# Patient Record
Sex: FEMALE | Race: BLACK OR AFRICAN AMERICAN | NOT HISPANIC OR LATINO | URBAN - METROPOLITAN AREA
[De-identification: names, ages, dates, MRNs, and addresses within clinical notes are randomized per-mention and may not be internally consistent; named-entity substitution may affect disease eponyms.]

---

## 2021-02-16 ENCOUNTER — EMERGENCY (EMERGENCY)
Facility: HOSPITAL | Age: 51
LOS: 0 days | Discharge: HOME | End: 2021-02-16
Attending: EMERGENCY MEDICINE
Payer: COMMERCIAL

## 2021-02-16 VITALS
RESPIRATION RATE: 20 BRPM | OXYGEN SATURATION: 98 % | DIASTOLIC BLOOD PRESSURE: 61 MMHG | SYSTOLIC BLOOD PRESSURE: 109 MMHG | HEART RATE: 89 BPM | TEMPERATURE: 98 F

## 2021-02-16 VITALS
HEIGHT: 63 IN | HEART RATE: 108 BPM | DIASTOLIC BLOOD PRESSURE: 94 MMHG | OXYGEN SATURATION: 97 % | WEIGHT: 169.98 LBS | RESPIRATION RATE: 21 BRPM | SYSTOLIC BLOOD PRESSURE: 137 MMHG | TEMPERATURE: 99 F

## 2021-02-16 DIAGNOSIS — D21.9 BENIGN NEOPLASM OF CONNECTIVE AND OTHER SOFT TISSUE, UNSPECIFIED: ICD-10-CM

## 2021-02-16 DIAGNOSIS — U07.1 COVID-19: ICD-10-CM

## 2021-02-16 DIAGNOSIS — R05 COUGH: ICD-10-CM

## 2021-02-16 DIAGNOSIS — R53.1 WEAKNESS: ICD-10-CM

## 2021-02-16 DIAGNOSIS — R50.9 FEVER, UNSPECIFIED: ICD-10-CM

## 2021-02-16 DIAGNOSIS — Z98.89 OTHER SPECIFIED POSTPROCEDURAL STATES: Chronic | ICD-10-CM

## 2021-02-16 LAB
ALBUMIN SERPL ELPH-MCNC: 2.9 G/DL — LOW (ref 3.3–5)
ALP SERPL-CCNC: 164 U/L — HIGH (ref 40–120)
ALT FLD-CCNC: 73 U/L — SIGNIFICANT CHANGE UP (ref 12–78)
ANION GAP SERPL CALC-SCNC: 8 MMOL/L — SIGNIFICANT CHANGE UP (ref 5–17)
AST SERPL-CCNC: 60 U/L — HIGH (ref 15–37)
BASOPHILS # BLD AUTO: 0 K/UL — SIGNIFICANT CHANGE UP (ref 0–0.2)
BASOPHILS NFR BLD AUTO: 0 % — SIGNIFICANT CHANGE UP (ref 0–2)
BILIRUB SERPL-MCNC: 0.5 MG/DL — SIGNIFICANT CHANGE UP (ref 0.2–1.2)
BUN SERPL-MCNC: 10 MG/DL — SIGNIFICANT CHANGE UP (ref 7–23)
CALCIUM SERPL-MCNC: 8.8 MG/DL — SIGNIFICANT CHANGE UP (ref 8.5–10.1)
CHLORIDE SERPL-SCNC: 107 MMOL/L — SIGNIFICANT CHANGE UP (ref 96–108)
CO2 SERPL-SCNC: 27 MMOL/L — SIGNIFICANT CHANGE UP (ref 22–31)
CREAT SERPL-MCNC: 0.61 MG/DL — SIGNIFICANT CHANGE UP (ref 0.5–1.3)
EOSINOPHIL # BLD AUTO: 0 K/UL — SIGNIFICANT CHANGE UP (ref 0–0.5)
EOSINOPHIL NFR BLD AUTO: 0 % — SIGNIFICANT CHANGE UP (ref 0–6)
FLUAV AG NPH QL: SIGNIFICANT CHANGE UP
FLUBV AG NPH QL: SIGNIFICANT CHANGE UP
GLUCOSE SERPL-MCNC: 125 MG/DL — HIGH (ref 70–99)
HCT VFR BLD CALC: 38.5 % — SIGNIFICANT CHANGE UP (ref 34.5–45)
HGB BLD-MCNC: 12.7 G/DL — SIGNIFICANT CHANGE UP (ref 11.5–15.5)
LYMPHOCYTES # BLD AUTO: 0.59 K/UL — LOW (ref 1–3.3)
LYMPHOCYTES # BLD AUTO: 5 % — LOW (ref 13–44)
MANUAL SMEAR VERIFICATION: SIGNIFICANT CHANGE UP
MCHC RBC-ENTMCNC: 27.3 PG — SIGNIFICANT CHANGE UP (ref 27–34)
MCHC RBC-ENTMCNC: 33 GM/DL — SIGNIFICANT CHANGE UP (ref 32–36)
MCV RBC AUTO: 82.8 FL — SIGNIFICANT CHANGE UP (ref 80–100)
MONOCYTES # BLD AUTO: 0.36 K/UL — SIGNIFICANT CHANGE UP (ref 0–0.9)
MONOCYTES NFR BLD AUTO: 3 % — SIGNIFICANT CHANGE UP (ref 2–14)
NEUTROPHILS # BLD AUTO: 10.93 K/UL — HIGH (ref 1.8–7.4)
NEUTROPHILS NFR BLD AUTO: 92 % — HIGH (ref 43–77)
NRBC # BLD: 0 /100 — SIGNIFICANT CHANGE UP (ref 0–0)
NRBC # BLD: SIGNIFICANT CHANGE UP /100 WBCS (ref 0–0)
PLAT MORPH BLD: NORMAL — SIGNIFICANT CHANGE UP
PLATELET # BLD AUTO: 366 K/UL — SIGNIFICANT CHANGE UP (ref 150–400)
POTASSIUM SERPL-MCNC: 3.9 MMOL/L — SIGNIFICANT CHANGE UP (ref 3.5–5.3)
POTASSIUM SERPL-SCNC: 3.9 MMOL/L — SIGNIFICANT CHANGE UP (ref 3.5–5.3)
PROT SERPL-MCNC: 7.5 GM/DL — SIGNIFICANT CHANGE UP (ref 6–8.3)
RBC # BLD: 4.65 M/UL — SIGNIFICANT CHANGE UP (ref 3.8–5.2)
RBC # FLD: 14.5 % — SIGNIFICANT CHANGE UP (ref 10.3–14.5)
RBC BLD AUTO: SIGNIFICANT CHANGE UP
SARS-COV-2 RNA SPEC QL NAA+PROBE: DETECTED
SODIUM SERPL-SCNC: 142 MMOL/L — SIGNIFICANT CHANGE UP (ref 135–145)
WBC # BLD: 11.88 K/UL — HIGH (ref 3.8–10.5)
WBC # FLD AUTO: 11.88 K/UL — HIGH (ref 3.8–10.5)

## 2021-02-16 PROCEDURE — 71045 X-RAY EXAM CHEST 1 VIEW: CPT | Mod: 26

## 2021-02-16 PROCEDURE — 99284 EMERGENCY DEPT VISIT MOD MDM: CPT

## 2021-02-16 RX ORDER — ONDANSETRON 8 MG/1
8 TABLET, FILM COATED ORAL ONCE
Refills: 0 | Status: COMPLETED | OUTPATIENT
Start: 2021-02-16 | End: 2021-02-16

## 2021-02-16 RX ORDER — ACETAMINOPHEN 500 MG
975 TABLET ORAL ONCE
Refills: 0 | Status: COMPLETED | OUTPATIENT
Start: 2021-02-16 | End: 2021-02-16

## 2021-02-16 RX ORDER — SODIUM CHLORIDE 9 MG/ML
1000 INJECTION INTRAMUSCULAR; INTRAVENOUS; SUBCUTANEOUS ONCE
Refills: 0 | Status: COMPLETED | OUTPATIENT
Start: 2021-02-16 | End: 2021-02-16

## 2021-02-16 RX ADMIN — Medication 975 MILLIGRAM(S): at 10:08

## 2021-02-16 RX ADMIN — SODIUM CHLORIDE 1000 MILLILITER(S): 9 INJECTION INTRAMUSCULAR; INTRAVENOUS; SUBCUTANEOUS at 10:09

## 2021-02-16 RX ADMIN — ONDANSETRON 8 MILLIGRAM(S): 8 TABLET, FILM COATED ORAL at 10:08

## 2021-02-16 RX ADMIN — Medication 200 MILLIGRAM(S): at 10:08

## 2021-02-16 NOTE — ED ADULT NURSE NOTE - OBJECTIVE STATEMENT
Patient c/o productive cough and fever for 2 weeks, today it has been progressively worse. Patient is unable to sleep. Patient also c/o body aches  and diarrhea. Patient took 2 tabs of ES Tylenol with no result.

## 2021-02-16 NOTE — ED PROVIDER NOTE - PATIENT PORTAL LINK FT
You can access the FollowMyHealth Patient Portal offered by [facility] by registering at the following website: [email or web address]. By joining [employer]’s FollowMyHealth portal, you will also be able to view your health information using other applications (apps) compatible with our system.

## 2021-02-16 NOTE — ED PROVIDER NOTE - CLINICAL SUMMARY MEDICAL DECISION MAKING FREE TEXT BOX
Pt with Covid 19 infection with O2 sat 95% otherwise will dc with zofran/tessalon perles and Tylenol for symptom relief and discussed to return if O2 sat  less that 92%  - pt has Pulse Oximeter already.

## 2021-02-16 NOTE — ED ADULT TRIAGE NOTE - CHIEF COMPLAINT QUOTE
Pt biba from home c/o fever on and off for the past 2 weeks, cough generalized weakness  , denies any medical hx

## 2021-02-16 NOTE — ED PROVIDER NOTE - WR ORDER NAME 1
I have personally performed a face to face diagnostic evaluation on this patient. I have reviewed the ACP note and agree with the history, exam and plan of care, except as noted.
Xray Chest 1 View- PORTABLE-Urgent

## 2021-02-16 NOTE — ED PROVIDER NOTE - OBJECTIVE STATEMENT
50 year old female with no PMH presenting to ED due to fever/cough, body weakness and aches with diarrhea and vomiting x3 headache, dizziness for past 2 weeks and states when first symptoms started - took Covid test - was negative at that time.

## 2021-08-26 ENCOUNTER — TRANSCRIPTION ENCOUNTER (OUTPATIENT)
Age: 51
End: 2021-08-26

## 2021-10-15 ENCOUNTER — TRANSCRIPTION ENCOUNTER (OUTPATIENT)
Age: 51
End: 2021-10-15

## 2022-06-09 ENCOUNTER — OUTPATIENT (OUTPATIENT)
Dept: OUTPATIENT SERVICES | Facility: HOSPITAL | Age: 52
LOS: 1 days | End: 2022-06-09
Payer: COMMERCIAL

## 2022-06-09 VITALS
DIASTOLIC BLOOD PRESSURE: 86 MMHG | HEART RATE: 70 BPM | HEIGHT: 63 IN | WEIGHT: 162.92 LBS | OXYGEN SATURATION: 100 % | RESPIRATION RATE: 20 BRPM | TEMPERATURE: 97 F | SYSTOLIC BLOOD PRESSURE: 133 MMHG

## 2022-06-09 DIAGNOSIS — Z98.84 BARIATRIC SURGERY STATUS: Chronic | ICD-10-CM

## 2022-06-09 DIAGNOSIS — J32.2 CHRONIC ETHMOIDAL SINUSITIS: ICD-10-CM

## 2022-06-09 DIAGNOSIS — Z98.89 OTHER SPECIFIED POSTPROCEDURAL STATES: Chronic | ICD-10-CM

## 2022-06-09 DIAGNOSIS — Z01.818 ENCOUNTER FOR OTHER PREPROCEDURAL EXAMINATION: ICD-10-CM

## 2022-06-09 DIAGNOSIS — J32.1 CHRONIC FRONTAL SINUSITIS: ICD-10-CM

## 2022-06-09 DIAGNOSIS — J32.3 CHRONIC SPHENOIDAL SINUSITIS: ICD-10-CM

## 2022-06-09 DIAGNOSIS — Z98.890 OTHER SPECIFIED POSTPROCEDURAL STATES: Chronic | ICD-10-CM

## 2022-06-09 DIAGNOSIS — J32.0 CHRONIC MAXILLARY SINUSITIS: ICD-10-CM

## 2022-06-09 DIAGNOSIS — J34.3 HYPERTROPHY OF NASAL TURBINATES: ICD-10-CM

## 2022-06-09 LAB
ANION GAP SERPL CALC-SCNC: 10 MMOL/L — SIGNIFICANT CHANGE UP (ref 5–17)
BUN SERPL-MCNC: 16 MG/DL — SIGNIFICANT CHANGE UP (ref 7–23)
CALCIUM SERPL-MCNC: 9.4 MG/DL — SIGNIFICANT CHANGE UP (ref 8.4–10.5)
CHLORIDE SERPL-SCNC: 106 MMOL/L — SIGNIFICANT CHANGE UP (ref 96–108)
CO2 SERPL-SCNC: 28 MMOL/L — SIGNIFICANT CHANGE UP (ref 22–31)
CREAT SERPL-MCNC: 0.7 MG/DL — SIGNIFICANT CHANGE UP (ref 0.5–1.3)
EGFR: 105 ML/MIN/1.73M2 — SIGNIFICANT CHANGE UP
GLUCOSE SERPL-MCNC: 76 MG/DL — SIGNIFICANT CHANGE UP (ref 70–99)
HCT VFR BLD CALC: 38.3 % — SIGNIFICANT CHANGE UP (ref 34.5–45)
HGB BLD-MCNC: 11.8 G/DL — SIGNIFICANT CHANGE UP (ref 11.5–15.5)
MCHC RBC-ENTMCNC: 27.4 PG — SIGNIFICANT CHANGE UP (ref 27–34)
MCHC RBC-ENTMCNC: 30.8 GM/DL — LOW (ref 32–36)
MCV RBC AUTO: 89.1 FL — SIGNIFICANT CHANGE UP (ref 80–100)
NRBC # BLD: 0 /100 WBCS — SIGNIFICANT CHANGE UP (ref 0–0)
PLATELET # BLD AUTO: 257 K/UL — SIGNIFICANT CHANGE UP (ref 150–400)
POTASSIUM SERPL-MCNC: 4.5 MMOL/L — SIGNIFICANT CHANGE UP (ref 3.5–5.3)
POTASSIUM SERPL-SCNC: 4.5 MMOL/L — SIGNIFICANT CHANGE UP (ref 3.5–5.3)
RBC # BLD: 4.3 M/UL — SIGNIFICANT CHANGE UP (ref 3.8–5.2)
RBC # FLD: 13.7 % — SIGNIFICANT CHANGE UP (ref 10.3–14.5)
SODIUM SERPL-SCNC: 144 MMOL/L — SIGNIFICANT CHANGE UP (ref 135–145)
WBC # BLD: 4.41 K/UL — SIGNIFICANT CHANGE UP (ref 3.8–10.5)
WBC # FLD AUTO: 4.41 K/UL — SIGNIFICANT CHANGE UP (ref 3.8–10.5)

## 2022-06-09 PROCEDURE — G0463: CPT

## 2022-06-09 PROCEDURE — 36415 COLL VENOUS BLD VENIPUNCTURE: CPT

## 2022-06-09 PROCEDURE — 80048 BASIC METABOLIC PNL TOTAL CA: CPT

## 2022-06-09 PROCEDURE — 85027 COMPLETE CBC AUTOMATED: CPT

## 2022-06-09 RX ORDER — ALBUTEROL 90 UG/1
2 AEROSOL, METERED ORAL
Qty: 0 | Refills: 0 | DISCHARGE

## 2022-06-09 RX ORDER — AZITHROMYCIN 500 MG/1
0 TABLET, FILM COATED ORAL
Qty: 0 | Refills: 0 | DISCHARGE

## 2022-06-09 NOTE — H&P PST ADULT - NSICDXPASTSURGICALHX_GEN_ALL_CORE_FT
PAST SURGICAL HISTORY:   delivery delivered x4    H/O arthroscopy of knee left knee    H/O bariatric surgery gastric sleeve   6/3/2021    History of colonoscopy x7 years ago  and upper endoscopy    History of rhinoplasty x8 years     PAST SURGICAL HISTORY:   delivery delivered x4    H/O arthroscopy of knee left knee    H/O bariatric surgery gastric sleeve   6/3/2021  prior to surgery weight 230 pounds    History of colonoscopy x7 years ago  and upper endoscopy    History of rhinoplasty x8 years

## 2022-06-09 NOTE — H&P PST ADULT - NSICDXFAMILYHX_GEN_ALL_CORE_FT
FAMILY HISTORY:  Father  Still living? No  FHx: heart disease, Age at diagnosis: Age Unknown    Mother  Still living? Yes, Estimated age: 71-80  Family hx of hypertension, Age at diagnosis: Age Unknown  FH: diabetes mellitus, Age at diagnosis: Age Unknown

## 2022-06-09 NOTE — H&P PST ADULT - HISTORY OF PRESENT ILLNESS
51 year old female with chronic sinus sinusitis, diagnosed with hypertrophy of nasal turbinates, presents for preop testing for scheduled 51 year old female with chronic sinus sinusitis, diagnosed with hypertrophy of nasal turbinates, states MRI showed nasal polyps, presents for preop testing for scheduled bilateral turbinectomy/ maxillary antrostomy with tissue removal/ total ethmoidectomy including sphenoidotomy/ Medfusion on 6/30/2022. Pt denies any fever, c/o occasional headaches, sinus pressure, no epistaxis.   Patient also denies any symptoms of covid-19, scheduled for covid-19 PCR on 6/27/2022 at [facility].  51 year old female with chronic sinusitis, not currently on any medication, was diagnosed with hypertrophy of nasal turbinates, states MRI showed nasal polyps, presents for preop testing for scheduled bilateral turbinectomy/ maxillary antrostomy with tissue removal/ total ethmoidectomy including sphenoidotomy/ Medfusion on 6/30/2022. Pt denies any fever, c/o occasional headaches, sinus pressure, no epistaxis.   Patient also denies any symptoms of covid-19, scheduled for covid-19 PCR on 6/27/2022 at [facility].

## 2022-06-09 NOTE — H&P PST ADULT - NSICDXPASTMEDICALHX_GEN_ALL_CORE_FT
PAST MEDICAL HISTORY:  2019 novel coronavirus disease (COVID-19) 2020-  fever, SOB, headaches, vomiting and diarrhea    Chronic ethmoidal sinusitis     Fibroid     History of constipation     History of heartburn     History of migraine headaches

## 2022-06-09 NOTE — H&P PST ADULT - PROBLEM SELECTOR PLAN 1
Scheduled for bilateral turbinectomy/ maxillary antrostomy with tissue removal/ total ethmoidectomy including sphenoidectomy/ frontal sinustomy with Medfusion   preop instruction discussed and pt verbalized understanding  ucg on admit, urine cup given

## 2022-07-11 PROBLEM — Z87.19 PERSONAL HISTORY OF OTHER DISEASES OF THE DIGESTIVE SYSTEM: Chronic | Status: ACTIVE | Noted: 2022-06-09

## 2022-07-11 PROBLEM — J32.2 CHRONIC ETHMOIDAL SINUSITIS: Chronic | Status: ACTIVE | Noted: 2022-06-09

## 2022-07-11 PROBLEM — Z87.898 PERSONAL HISTORY OF OTHER SPECIFIED CONDITIONS: Chronic | Status: ACTIVE | Noted: 2022-06-09

## 2022-07-11 PROBLEM — Z86.69 PERSONAL HISTORY OF OTHER DISEASES OF THE NERVOUS SYSTEM AND SENSE ORGANS: Chronic | Status: ACTIVE | Noted: 2022-06-09

## 2022-07-14 ENCOUNTER — APPOINTMENT (OUTPATIENT)
Dept: NEUROSURGERY | Facility: CLINIC | Age: 52
End: 2022-07-14

## 2022-07-14 PROCEDURE — 99203 OFFICE O/P NEW LOW 30 MIN: CPT

## 2022-07-14 NOTE — HISTORY OF PRESENT ILLNESS
[FreeTextEntry1] : nasal mass [de-identified] : 51F, no major PMH, PSH of nasal surgery/rhinoplasty 15 years ago. 5 years ago developed copious watery drainage from nose, a/w positional headache, worsened with sitting up, yelling, izabela savla, improved with laying down. Also c/o progressive vision loss over last 5+ years, +/- photophobia (mild). Denies other HA, lethargy, N/V, dizziness, phonophobia. 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 Sent here by ENT preop team for evaluation.\par \par Neuro exam: \par \par AAOx3, EOMI, PERRL, FS, TM, VFF\par MAEx4, 5/5\par SILT\par no mounika nasal leakage

## 2022-07-14 NOTE — ASSESSMENT
[FreeTextEntry1] : 51F, hx of rhinoplasty 15 years ago, clear nasal drainage x 5 years, stopped 8 months ago, developed fullness in R nose, found to have large R cyst in ethmoid/maxillary/frontal sinuses w/ inferior anterior fossa dehiscence adjacent to cribriform plate. Sent for neurosurgical consultation by ENT.\par \par - to see Dr. Woodward with ENT for preop consultation\par - preop for endoscopic cyst fenestration/resection, skull base exlporation, possible repair of CSF leak +/- lumbar drain. Further discussion pending ENT evaluation\par - follow up with Beta 2 transferrin study re: nasal fluid  \par - Report to ED immediately with signs of photopohbia, phonophobia, severe HA, or other concerning neurologic symptoms\par \par Patient seen and discussed with attending, Dr. Jimenez\par Spent a total of 45 minutes with patient

## 2022-07-25 ENCOUNTER — APPOINTMENT (OUTPATIENT)
Dept: OTOLARYNGOLOGY | Facility: CLINIC | Age: 52
End: 2022-07-25

## 2022-07-25 VITALS
WEIGHT: 161 LBS | BODY MASS INDEX: 29.63 KG/M2 | HEART RATE: 80 BPM | SYSTOLIC BLOOD PRESSURE: 129 MMHG | DIASTOLIC BLOOD PRESSURE: 79 MMHG | HEIGHT: 62 IN | OXYGEN SATURATION: 98 %

## 2022-07-25 DIAGNOSIS — J34.89 OTHER SPECIFIED DISORDERS OF NOSE AND NASAL SINUSES: ICD-10-CM

## 2022-07-25 PROCEDURE — 31231 NASAL ENDOSCOPY DX: CPT

## 2022-07-25 PROCEDURE — 99205 OFFICE O/P NEW HI 60 MIN: CPT | Mod: 25

## 2022-07-25 RX ORDER — METHYLPREDNISOLONE 4 MG/1
4 TABLET ORAL
Qty: 21 | Refills: 0 | Status: COMPLETED | COMMUNITY
Start: 2022-05-12

## 2022-07-25 RX ORDER — PREDNISONE 20 MG/1
20 TABLET ORAL
Qty: 10 | Refills: 0 | Status: COMPLETED | COMMUNITY
Start: 2022-03-24

## 2022-07-25 RX ORDER — CEFDINIR 300 MG/1
300 CAPSULE ORAL
Qty: 20 | Refills: 0 | Status: COMPLETED | COMMUNITY
Start: 2022-05-12

## 2022-07-25 NOTE — PROCEDURE
[FreeTextEntry6] : Flexible scope #214 was used. Right nasal passage completely obstructed by polypoid mass. Greyish and no obvious pulsation. No leakage of fluid.  Left nasal passage with normal inferior, middle and superior turbinates. Nasal passage was patent with clear middle meatus. NP with mass crossing from right. No mucopurulence.\par \par

## 2022-07-25 NOTE — ASSESSMENT
[FreeTextEntry1] : 52 y/o F with Hx of Rhinitis 15 y/a.  Now with Large right nasal mass and right nasal d/c.\par - Reached out to Dr. Noyola's office, spoke with Dr. Fitzpatrick who noted Pos B2 transferrin confirming CSF leak\par - of note she reports no leakage in the past couple weeks \par - Reviewed MRI  and CT scan results noting Right Nasal mass and Dehiscence of right posterior frontal bone inferiorly\par - discussed with Dr. Jimenez that this may be difficult to access endonasally although could attempt to with drilling down of frontal beak to allow more access\par - would need lumbar drain and can consider inlay by Dr. Jimenez plus mucosal free graft\par - pt will f/u with Dr. Jimenez to discuss further and schedule surgery\par \par \par I personally saw and examined Ms. RILEY LOPEZ in detail this visit today. I personally reviewed the HPI, PMH, FH, SH, ROS and medications/allergies. I have spoken to KENROY Stanley regarding the history and have personally determined the assessment and plan of care, and documented this myself. I was present and participated in all key portions of the encounter and all procedures noted above. I have made changes in the body of the note where appropriate.\par \par Attesting Faculty: See Attending Signature Below

## 2022-07-25 NOTE — CONSULT LETTER
[Dear  ___] : Dear  [unfilled], [Consult Letter:] : I had the pleasure of evaluating your patient, [unfilled]. [Please see my note below.] : Please see my note below. [Consult Closing:] : Thank you very much for allowing me to participate in the care of this patient.  If you have any questions, please do not hesitate to contact me. [Sincerely,] : Sincerely, [DrHussein  ___] : Dr. JENSEN [FreeTextEntry3] : Marah Woodward MD\par Otolaryngology and Cranial Base Surgery\par Attending Physician - Department of Otolaryngology and Head & Neck Surgery\par [facility]\par  - [employer] [facility]\par Office: [phone]\par Fax: [phone]\par

## 2022-09-12 ENCOUNTER — APPOINTMENT (OUTPATIENT)
Dept: CT IMAGING | Facility: IMAGING CENTER | Age: 52
End: 2022-09-12

## 2022-09-15 ENCOUNTER — OUTPATIENT (OUTPATIENT)
Dept: OUTPATIENT SERVICES | Facility: HOSPITAL | Age: 52
LOS: 1 days | End: 2022-09-15
Payer: COMMERCIAL

## 2022-09-15 VITALS
WEIGHT: 160.06 LBS | SYSTOLIC BLOOD PRESSURE: 133 MMHG | TEMPERATURE: 98 F | DIASTOLIC BLOOD PRESSURE: 84 MMHG | RESPIRATION RATE: 20 BRPM | OXYGEN SATURATION: 100 % | HEIGHT: 62 IN | HEART RATE: 78 BPM

## 2022-09-15 DIAGNOSIS — Z98.890 OTHER SPECIFIED POSTPROCEDURAL STATES: Chronic | ICD-10-CM

## 2022-09-15 DIAGNOSIS — Z98.84 BARIATRIC SURGERY STATUS: Chronic | ICD-10-CM

## 2022-09-15 DIAGNOSIS — G96.01 CRANIAL CEREBROSPINAL FLUID LEAK, SPONTANEOUS: ICD-10-CM

## 2022-09-15 DIAGNOSIS — G96.08 OTHER CRANIAL CEREBROSPINAL FLUID LEAK: ICD-10-CM

## 2022-09-15 DIAGNOSIS — Z29.9 ENCOUNTER FOR PROPHYLACTIC MEASURES, UNSPECIFIED: ICD-10-CM

## 2022-09-15 DIAGNOSIS — Z98.51 TUBAL LIGATION STATUS: Chronic | ICD-10-CM

## 2022-09-15 DIAGNOSIS — Z01.818 ENCOUNTER FOR OTHER PREPROCEDURAL EXAMINATION: ICD-10-CM

## 2022-09-15 LAB
ANION GAP SERPL CALC-SCNC: 12 MMOL/L — SIGNIFICANT CHANGE UP (ref 5–17)
BLD GP AB SCN SERPL QL: NEGATIVE — SIGNIFICANT CHANGE UP
BUN SERPL-MCNC: 15 MG/DL — SIGNIFICANT CHANGE UP (ref 7–23)
CALCIUM SERPL-MCNC: 9.5 MG/DL — SIGNIFICANT CHANGE UP (ref 8.4–10.5)
CHLORIDE SERPL-SCNC: 105 MMOL/L — SIGNIFICANT CHANGE UP (ref 96–108)
CO2 SERPL-SCNC: 27 MMOL/L — SIGNIFICANT CHANGE UP (ref 22–31)
CREAT SERPL-MCNC: 0.69 MG/DL — SIGNIFICANT CHANGE UP (ref 0.5–1.3)
EGFR: 105 ML/MIN/1.73M2 — SIGNIFICANT CHANGE UP
GLUCOSE SERPL-MCNC: 71 MG/DL — SIGNIFICANT CHANGE UP (ref 70–99)
HCT VFR BLD CALC: 37.8 % — SIGNIFICANT CHANGE UP (ref 34.5–45)
HGB BLD-MCNC: 11.9 G/DL — SIGNIFICANT CHANGE UP (ref 11.5–15.5)
MCHC RBC-ENTMCNC: 28.1 PG — SIGNIFICANT CHANGE UP (ref 27–34)
MCHC RBC-ENTMCNC: 31.5 GM/DL — LOW (ref 32–36)
MCV RBC AUTO: 89.2 FL — SIGNIFICANT CHANGE UP (ref 80–100)
NRBC # BLD: 0 /100 WBCS — SIGNIFICANT CHANGE UP (ref 0–0)
PLATELET # BLD AUTO: 252 K/UL — SIGNIFICANT CHANGE UP (ref 150–400)
POTASSIUM SERPL-MCNC: 4.4 MMOL/L — SIGNIFICANT CHANGE UP (ref 3.5–5.3)
POTASSIUM SERPL-SCNC: 4.4 MMOL/L — SIGNIFICANT CHANGE UP (ref 3.5–5.3)
RBC # BLD: 4.24 M/UL — SIGNIFICANT CHANGE UP (ref 3.8–5.2)
RBC # FLD: 14.1 % — SIGNIFICANT CHANGE UP (ref 10.3–14.5)
RH IG SCN BLD-IMP: POSITIVE — SIGNIFICANT CHANGE UP
SODIUM SERPL-SCNC: 144 MMOL/L — SIGNIFICANT CHANGE UP (ref 135–145)
WBC # BLD: 5.87 K/UL — SIGNIFICANT CHANGE UP (ref 3.8–10.5)
WBC # FLD AUTO: 5.87 K/UL — SIGNIFICANT CHANGE UP (ref 3.8–10.5)

## 2022-09-15 PROCEDURE — G0463: CPT

## 2022-09-15 PROCEDURE — 85027 COMPLETE CBC AUTOMATED: CPT

## 2022-09-15 PROCEDURE — 86850 RBC ANTIBODY SCREEN: CPT

## 2022-09-15 PROCEDURE — 36415 COLL VENOUS BLD VENIPUNCTURE: CPT

## 2022-09-15 PROCEDURE — 86901 BLOOD TYPING SEROLOGIC RH(D): CPT

## 2022-09-15 PROCEDURE — 86900 BLOOD TYPING SEROLOGIC ABO: CPT

## 2022-09-15 PROCEDURE — 80048 BASIC METABOLIC PNL TOTAL CA: CPT

## 2022-09-15 RX ORDER — CEFAZOLIN SODIUM 1 G
2000 VIAL (EA) INJECTION ONCE
Refills: 0 | Status: DISCONTINUED | OUTPATIENT
Start: 2022-09-22 | End: 2022-09-23

## 2022-09-15 NOTE — H&P PST ADULT - NSICDXPASTSURGICALHX_GEN_ALL_CORE_FT
PAST SURGICAL HISTORY:   delivery delivered x4    H/O arthroscopy of knee left knee    H/O bariatric surgery gastric sleeve   6/3/2021  prior to surgery weight 230 pounds    History of colonoscopy x7 years ago  and upper endoscopy    History of rhinoplasty x8 years     PAST SURGICAL HISTORY:   delivery delivered x4    H/O arthroscopy of knee left knee    H/O bariatric surgery gastric sleeve   6/3/2021  prior to surgery weight 230 pounds    H/O tubal ligation x6 years ago    History of colonoscopy x7 years ago  and upper endoscopy    History of rhinoplasty x8 years

## 2022-09-15 NOTE — H&P PST ADULT - PROBLEM SELECTOR PLAN 1
Scheduled for endonasal and transcranial repair of CSF leak with abdominal fat graft   pt provided with verbal and written preop instruction, verbalized understanding and teach back

## 2022-09-15 NOTE — H&P PST ADULT - ASSESSMENT
CAPRINI SCORE [CLOT updated 18]    AGE RELATED RISK FACTORS                                                       MOBILITY RELATED FACTORS  [1 ] Age 41-60 years                                            (1 Point)                    [ ] Bed rest                                                        (1 Point)  [ ] Age: 61-74 years                                           (2 Points)                  [ ] Plaster cast                                                   (2 Points)  [ ] Age= 75 years                                              (3 Points)                    [ ] Bed bound for more than 72 hours                 (2 Points)    DISEASE RELATED RISK FACTORS                                               GENDER SPECIFIC FACTORS  [ ] Edema in the lower extremities                       (1 Point)              [ ] Pregnancy                                                     (1 Point)  [ ] Varicose veins                                               (1 Point)                     [ ] Post-partum < 6 weeks                                   (1 Point)             [ 1] BMI > 25 Kg/m2                                            (1 Point)                     [ ] Hormonal therapy  or oral contraception          (1 Point)                 [ ] Sepsis (in the previous month)                        (1 Point)               [ ] History of pregnancy complications                 (1 point)  [ ] Pneumonia or serious lung disease                                               [ ] Unexplained or recurrent                     (1 Point)           (in the previous month)                               (1 Point)  [ ] Abnormal pulmonary function test                     (1 Point)                 SURGERY RELATED RISK FACTORS  [ ] Acute myocardial infarction                              (1 Point)               [ ]  Section                                             (1 Point)  [ ] Congestive heart failure (in the previous month)  (1 Point)      [ ] Minor surgery                                                  (1 Point)   [ ] Inflammatory bowel disease                             (1 Point)               [ ] Arthroscopic surgery                                        (2 Points)  [ ] Central venous access                                      (2 Points)                [2 ] General surgery lasting more than 45 minutes (2 points)  [ ] Present or previous malignancy                     (2 Points)                [ ] Elective arthroplasty                                         (5 points)    [ ] Stroke (in the previous month)                          (5 Points)                                                                                                                                                           HEMATOLOGY RELATED FACTORS                                                 TRAUMA RELATED RISK FACTORS  [ ] Prior episodes of VTE                                     (3 Points)                [ ] Fracture of the hip, pelvis, or leg                       (5 Points)  [ ] Positive family history for VTE                         (3 Points)             [ ] Acute spinal cord injury (in the previous month)  (5 Points)  [ ] Prothrombin 76458 A                                     (3 Points)               [ ] Paralysis  (less than 1 month)                             (5 Points)  [ ] Factor V Leiden                                             (3 Points)                  [ ] Multiple Trauma within 1 month                        (5 Points)  [ ] Lupus anticoagulants                                     (3 Points)                                                           [ ] Anticardiolipin antibodies                               (3 Points)                                                       [ ] High homocysteine in the blood                      (3 Points)                                             [ ] Other congenital or acquired thrombophilia      (3 Points)                                                [ ] Heparin induced thrombocytopenia                  (3 Points)                                     Total Score [ 4]

## 2022-09-15 NOTE — H&P PST ADULT - SYMPTOMS
Fax script   PSK  
I will reprint this and come to sign later today for faxing.  Let patient know it will be faxed tomorrow.      CHAD  
Reason for Call:  Other prescription    Detailed comments: Pt states that the medication below did not go through to the pharmacy but the rest of the ones that were prescribed from her appt today did. Thank you. She would also like to request a call back to confirm that it has been re sent over. Thank you    typhoid (VIVOTIF) CR capsule    [id number] - [place], [place] - [address]  [phone]      Phone Number Patient can be reached at: Home number on file [phone] (home)    Best Time: ANy    Can we leave a detailed message on this number? NO    Call taken on 11/14/2019 at 2:16 PM by [place] Maya    typhoid (VIVOTIF) CR capsule    [id number] - [place], [place] - [address]  [phone]  
Rx faxed.    Jeanette DIAZ, Patient Care     
This writer attempted to contact 1 on 11/14/19      Reason for call Rx and left detailed message.      If patient calls back: Let patient know it will be faxed tomorrow.     Relay message , (read verbatim), document that pt called and close encounter      Patient informed via message that Rx will be faxed tomorrow.  [id number], MEDICAL ASSISTANT     
Unable to locate Rx, appears it was a local print.    Jeanette DIAZ, Patient Care       
none

## 2022-09-15 NOTE — H&P PST ADULT - ACTIVITY
walking, goes to the [employer] twice a week (treadmill for 30 mins walking, goes to the [employer] twice a week (treadmill for 30 mins), does heavy lifting

## 2022-09-15 NOTE — H&P PST ADULT - HISTORY OF PRESENT ILLNESS
51 year old female presents for preop testing for endonasal and transcranial repair of CSF leak with abdominal fat graft/ lumbar drain for cranial cerebrospinal fluid leak on 9/22/2022. Pt was originally scheduled back in June 2022 for nasal polypectomy and brain MRI revealed a leakage. She c/o chronic sinusitis, occasional "brain fog," no abnormal gait, no weakness, no headaches at present. Her medical history significant for obesity s/p bariatric surgery- gastric sleeve 6/3/2021, heartburn, previous covid-19 (2/2021, symptoms included fever, SOB, headaches, no hospitalization, no intubation).  She denies any symptoms of covid-19, and scheduled for PCR test on 9/19/2022

## 2022-09-15 NOTE — H&P PST ADULT - ATTENDING COMMENTS
The patient has had CSF rhinorrhea from a bony dehiscence in the frontal sinus, apparently after prior nasal surgery. She has no history of meningitis.    P) endoscopic endonasal repair of CSF leak. I have discussed the risks, benefits, and alternatives to surgery with the patient, and answered all questions. In particular, the risks of bleeding, infection, complications of lumbar drainage, and recurrent leak were discussed.

## 2022-09-15 NOTE — H&P PST ADULT - NSICDXPASTMEDICALHX_GEN_ALL_CORE_FT
PAST MEDICAL HISTORY:  2019 novel coronavirus disease (COVID-19) 2020-  fever, SOB, headaches, vomiting and diarrhea    Chronic ethmoidal sinusitis     Fibroid     History of constipation     History of heartburn     History of migraine headaches      PAST MEDICAL HISTORY:  2019 novel coronavirus disease (COVID-19) 2/16/2021  fever, SOB, headaches, vomiting and diarrhea    Chronic ethmoidal sinusitis     Fibroid     History of constipation     History of heartburn     History of migraine headaches      PAST MEDICAL HISTORY:  2019 novel coronavirus disease (COVID-19) 2/16/2021  fever, SOB, headaches, vomiting and diarrhea    Chronic ethmoidal sinusitis     Fibroid     H/O nasal polyp     History of constipation     History of heartburn     History of migraine headaches     Other cranial cerebrospinal fluid leak

## 2022-09-19 ENCOUNTER — OUTPATIENT (OUTPATIENT)
Dept: OUTPATIENT SERVICES | Facility: HOSPITAL | Age: 52
LOS: 1 days | End: 2022-09-19
Payer: COMMERCIAL

## 2022-09-19 DIAGNOSIS — Z11.52 ENCOUNTER FOR SCREENING FOR COVID-19: ICD-10-CM

## 2022-09-19 DIAGNOSIS — Z98.890 OTHER SPECIFIED POSTPROCEDURAL STATES: Chronic | ICD-10-CM

## 2022-09-19 DIAGNOSIS — Z98.84 BARIATRIC SURGERY STATUS: Chronic | ICD-10-CM

## 2022-09-19 DIAGNOSIS — Z98.51 TUBAL LIGATION STATUS: Chronic | ICD-10-CM

## 2022-09-19 LAB — SARS-COV-2 RNA SPEC QL NAA+PROBE: SIGNIFICANT CHANGE UP

## 2022-09-19 PROCEDURE — U0003: CPT

## 2022-09-19 PROCEDURE — C9803: CPT

## 2022-09-19 PROCEDURE — U0005: CPT

## 2022-09-21 ENCOUNTER — TRANSCRIPTION ENCOUNTER (OUTPATIENT)
Age: 52
End: 2022-09-21

## 2022-09-22 ENCOUNTER — INPATIENT (INPATIENT)
Facility: HOSPITAL | Age: 52
LOS: 4 days | Discharge: HOME | DRG: 25 | End: 2022-09-27
Attending: NEUROLOGICAL SURGERY | Admitting: NEUROLOGICAL SURGERY
Payer: COMMERCIAL

## 2022-09-22 ENCOUNTER — RESULT REVIEW (OUTPATIENT)
Age: 52
End: 2022-09-22

## 2022-09-22 ENCOUNTER — APPOINTMENT (OUTPATIENT)
Dept: NEUROSURGERY | Facility: HOSPITAL | Age: 52
End: 2022-09-22

## 2022-09-22 ENCOUNTER — APPOINTMENT (OUTPATIENT)
Dept: OTOLARYNGOLOGY | Facility: HOSPITAL | Age: 52
End: 2022-09-22

## 2022-09-22 VITALS
HEART RATE: 70 BPM | RESPIRATION RATE: 18 BRPM | HEIGHT: 62 IN | TEMPERATURE: 98 F | SYSTOLIC BLOOD PRESSURE: 138 MMHG | DIASTOLIC BLOOD PRESSURE: 88 MMHG | OXYGEN SATURATION: 100 % | WEIGHT: 160.06 LBS

## 2022-09-22 DIAGNOSIS — Z98.890 OTHER SPECIFIED POSTPROCEDURAL STATES: Chronic | ICD-10-CM

## 2022-09-22 DIAGNOSIS — G96.01 CRANIAL CEREBROSPINAL FLUID LEAK, SPONTANEOUS: ICD-10-CM

## 2022-09-22 DIAGNOSIS — Z98.51 TUBAL LIGATION STATUS: Chronic | ICD-10-CM

## 2022-09-22 DIAGNOSIS — Z98.84 BARIATRIC SURGERY STATUS: Chronic | ICD-10-CM

## 2022-09-22 LAB
ANION GAP SERPL CALC-SCNC: 10 MMOL/L — SIGNIFICANT CHANGE UP (ref 5–17)
BUN SERPL-MCNC: 11 MG/DL — SIGNIFICANT CHANGE UP (ref 7–23)
CALCIUM SERPL-MCNC: 8.8 MG/DL — SIGNIFICANT CHANGE UP (ref 8.4–10.5)
CHLORIDE SERPL-SCNC: 106 MMOL/L — SIGNIFICANT CHANGE UP (ref 96–108)
CO2 SERPL-SCNC: 25 MMOL/L — SIGNIFICANT CHANGE UP (ref 22–31)
CREAT SERPL-MCNC: 0.59 MG/DL — SIGNIFICANT CHANGE UP (ref 0.5–1.3)
EGFR: 109 ML/MIN/1.73M2 — SIGNIFICANT CHANGE UP
GLUCOSE SERPL-MCNC: 146 MG/DL — HIGH (ref 70–99)
HCT VFR BLD CALC: 35.9 % — SIGNIFICANT CHANGE UP (ref 34.5–45)
HGB BLD-MCNC: 11.3 G/DL — LOW (ref 11.5–15.5)
MAGNESIUM SERPL-MCNC: 2 MG/DL — SIGNIFICANT CHANGE UP (ref 1.6–2.6)
MCHC RBC-ENTMCNC: 27.5 PG — SIGNIFICANT CHANGE UP (ref 27–34)
MCHC RBC-ENTMCNC: 31.5 GM/DL — LOW (ref 32–36)
MCV RBC AUTO: 87.3 FL — SIGNIFICANT CHANGE UP (ref 80–100)
NRBC # BLD: 0 /100 WBCS — SIGNIFICANT CHANGE UP (ref 0–0)
PHOSPHATE SERPL-MCNC: 3.8 MG/DL — SIGNIFICANT CHANGE UP (ref 2.5–4.5)
PLATELET # BLD AUTO: 245 K/UL — SIGNIFICANT CHANGE UP (ref 150–400)
POTASSIUM SERPL-MCNC: 4 MMOL/L — SIGNIFICANT CHANGE UP (ref 3.5–5.3)
POTASSIUM SERPL-SCNC: 4 MMOL/L — SIGNIFICANT CHANGE UP (ref 3.5–5.3)
RBC # BLD: 4.11 M/UL — SIGNIFICANT CHANGE UP (ref 3.8–5.2)
RBC # FLD: 14.1 % — SIGNIFICANT CHANGE UP (ref 10.3–14.5)
RH IG SCN BLD-IMP: POSITIVE — SIGNIFICANT CHANGE UP
SODIUM SERPL-SCNC: 141 MMOL/L — SIGNIFICANT CHANGE UP (ref 135–145)
WBC # BLD: 9.34 K/UL — SIGNIFICANT CHANGE UP (ref 3.8–10.5)
WBC # FLD AUTO: 9.34 K/UL — SIGNIFICANT CHANGE UP (ref 3.8–10.5)

## 2022-09-22 PROCEDURE — 64999E: CUSTOM | Mod: 62

## 2022-09-22 PROCEDURE — 15740 ISLAND PEDICLE FLAP GRAFT: CPT

## 2022-09-22 PROCEDURE — 62272 THER SPI PNXR DRG CSF: CPT

## 2022-09-22 PROCEDURE — 15769 GRFG AUTOL SOFT TISS DIR EXC: CPT

## 2022-09-22 PROCEDURE — 31267 ENDOSCOPY MAXILLARY SINUS: CPT | Mod: RT

## 2022-09-22 PROCEDURE — 61782 SCAN PROC CRANIAL EXTRA: CPT

## 2022-09-22 PROCEDURE — 31255 NSL/SINS NDSC W/TOT ETHMDCT: CPT | Mod: RT

## 2022-09-22 PROCEDURE — 88331 PATH CONSLTJ SURG 1 BLK 1SPC: CPT | Mod: 26

## 2022-09-22 PROCEDURE — 88305 TISSUE EXAM BY PATHOLOGIST: CPT | Mod: 26

## 2022-09-22 PROCEDURE — 70450 CT HEAD/BRAIN W/O DYE: CPT | Mod: 26

## 2022-09-22 DEVICE — MAYFIELD SKULL PIN ADULT STEEL: Type: IMPLANTABLE DEVICE | Status: FUNCTIONAL

## 2022-09-22 DEVICE — DVC ADHERUS AUTOSPRAY W/ DURAL SEALANT EXT TIP: Type: IMPLANTABLE DEVICE | Status: FUNCTIONAL

## 2022-09-22 DEVICE — CATH EDM LUMBAR CLOSED TIP BARIUM IMPREGNATED 80CM: Type: IMPLANTABLE DEVICE | Status: FUNCTIONAL

## 2022-09-22 DEVICE — SURGICEL 2 X 14": Type: IMPLANTABLE DEVICE | Status: FUNCTIONAL

## 2022-09-22 DEVICE — KIT A-LINE 1LUM 20G X 12CM SAFE KIT: Type: IMPLANTABLE DEVICE | Status: FUNCTIONAL

## 2022-09-22 DEVICE — SURGIFLO HEMOSTATIC MATRIX KIT: Type: IMPLANTABLE DEVICE | Status: FUNCTIONAL

## 2022-09-22 DEVICE — SURGIFOAM PAD 8CM X 12.5CM X 10MM (100): Type: IMPLANTABLE DEVICE | Status: FUNCTIONAL

## 2022-09-22 RX ORDER — HYDROMORPHONE HYDROCHLORIDE 2 MG/ML
1 INJECTION INTRAMUSCULAR; INTRAVENOUS; SUBCUTANEOUS EVERY 6 HOURS
Refills: 0 | Status: DISCONTINUED | OUTPATIENT
Start: 2022-09-22 | End: 2022-09-22

## 2022-09-22 RX ORDER — FENTANYL CITRATE 50 UG/ML
25 INJECTION INTRAVENOUS ONCE
Refills: 0 | Status: DISCONTINUED | OUTPATIENT
Start: 2022-09-22 | End: 2022-09-22

## 2022-09-22 RX ORDER — METOCLOPRAMIDE HCL 10 MG
10 TABLET ORAL EVERY 8 HOURS
Refills: 0 | Status: DISCONTINUED | OUTPATIENT
Start: 2022-09-22 | End: 2022-09-27

## 2022-09-22 RX ORDER — HYDRALAZINE HCL 50 MG
5 TABLET ORAL ONCE
Refills: 0 | Status: COMPLETED | OUTPATIENT
Start: 2022-09-22 | End: 2022-09-22

## 2022-09-22 RX ORDER — SODIUM CHLORIDE 9 MG/ML
1000 INJECTION INTRAMUSCULAR; INTRAVENOUS; SUBCUTANEOUS
Refills: 0 | Status: DISCONTINUED | OUTPATIENT
Start: 2022-09-22 | End: 2022-09-23

## 2022-09-22 RX ORDER — ONDANSETRON 8 MG/1
4 TABLET, FILM COATED ORAL EVERY 6 HOURS
Refills: 0 | Status: DISCONTINUED | OUTPATIENT
Start: 2022-09-22 | End: 2022-09-25

## 2022-09-22 RX ORDER — SODIUM CHLORIDE 9 MG/ML
3 INJECTION INTRAMUSCULAR; INTRAVENOUS; SUBCUTANEOUS EVERY 8 HOURS
Refills: 0 | Status: DISCONTINUED | OUTPATIENT
Start: 2022-09-22 | End: 2022-09-22

## 2022-09-22 RX ORDER — FENTANYL CITRATE 50 UG/ML
50 INJECTION INTRAVENOUS ONCE
Refills: 0 | Status: DISCONTINUED | OUTPATIENT
Start: 2022-09-22 | End: 2022-09-22

## 2022-09-22 RX ORDER — HYDROMORPHONE HYDROCHLORIDE 2 MG/ML
0.5 INJECTION INTRAMUSCULAR; INTRAVENOUS; SUBCUTANEOUS EVERY 6 HOURS
Refills: 0 | Status: DISCONTINUED | OUTPATIENT
Start: 2022-09-22 | End: 2022-09-23

## 2022-09-22 RX ORDER — ACETAMINOPHEN 500 MG
325 TABLET ORAL EVERY 4 HOURS
Refills: 0 | Status: DISCONTINUED | OUTPATIENT
Start: 2022-09-22 | End: 2022-09-23

## 2022-09-22 RX ORDER — LEVETIRACETAM 250 MG/1
500 TABLET, FILM COATED ORAL EVERY 12 HOURS
Refills: 0 | Status: DISCONTINUED | OUTPATIENT
Start: 2022-09-22 | End: 2022-09-23

## 2022-09-22 RX ORDER — NAFCILLIN 10 G/100ML
2 INJECTION, POWDER, FOR SOLUTION INTRAVENOUS EVERY 4 HOURS
Refills: 0 | Status: COMPLETED | OUTPATIENT
Start: 2022-09-22 | End: 2022-09-23

## 2022-09-22 RX ORDER — HYDROMORPHONE HYDROCHLORIDE 2 MG/ML
0.25 INJECTION INTRAMUSCULAR; INTRAVENOUS; SUBCUTANEOUS ONCE
Refills: 0 | Status: DISCONTINUED | OUTPATIENT
Start: 2022-09-22 | End: 2022-09-22

## 2022-09-22 RX ORDER — POLYETHYLENE GLYCOL 3350 17 G/17G
17 POWDER, FOR SOLUTION ORAL DAILY
Refills: 0 | Status: DISCONTINUED | OUTPATIENT
Start: 2022-09-22 | End: 2022-09-23

## 2022-09-22 RX ORDER — SENNA PLUS 8.6 MG/1
2 TABLET ORAL AT BEDTIME
Refills: 0 | Status: DISCONTINUED | OUTPATIENT
Start: 2022-09-22 | End: 2022-09-27

## 2022-09-22 RX ORDER — LIDOCAINE HCL 20 MG/ML
0.2 VIAL (ML) INJECTION ONCE
Refills: 0 | Status: DISCONTINUED | OUTPATIENT
Start: 2022-09-22 | End: 2022-09-22

## 2022-09-22 RX ORDER — ACETAMINOPHEN 500 MG
1000 TABLET ORAL ONCE
Refills: 0 | Status: COMPLETED | OUTPATIENT
Start: 2022-09-22 | End: 2022-09-22

## 2022-09-22 RX ADMIN — NAFCILLIN 200 GRAM(S): 10 INJECTION, POWDER, FOR SOLUTION INTRAVENOUS at 22:54

## 2022-09-22 RX ADMIN — Medication 5 MILLIGRAM(S): at 20:13

## 2022-09-22 RX ADMIN — SODIUM CHLORIDE 75 MILLILITER(S): 9 INJECTION INTRAMUSCULAR; INTRAVENOUS; SUBCUTANEOUS at 20:16

## 2022-09-22 RX ADMIN — HYDROMORPHONE HYDROCHLORIDE 0.5 MILLIGRAM(S): 2 INJECTION INTRAMUSCULAR; INTRAVENOUS; SUBCUTANEOUS at 19:45

## 2022-09-22 RX ADMIN — HYDROMORPHONE HYDROCHLORIDE 0.25 MILLIGRAM(S): 2 INJECTION INTRAMUSCULAR; INTRAVENOUS; SUBCUTANEOUS at 19:10

## 2022-09-22 RX ADMIN — FENTANYL CITRATE 50 MICROGRAM(S): 50 INJECTION INTRAVENOUS at 18:50

## 2022-09-22 RX ADMIN — HYDROMORPHONE HYDROCHLORIDE 0.5 MILLIGRAM(S): 2 INJECTION INTRAMUSCULAR; INTRAVENOUS; SUBCUTANEOUS at 20:00

## 2022-09-22 RX ADMIN — HYDROMORPHONE HYDROCHLORIDE 0.25 MILLIGRAM(S): 2 INJECTION INTRAMUSCULAR; INTRAVENOUS; SUBCUTANEOUS at 19:25

## 2022-09-22 RX ADMIN — FENTANYL CITRATE 50 MICROGRAM(S): 50 INJECTION INTRAVENOUS at 19:05

## 2022-09-22 RX ADMIN — SENNA PLUS 2 TABLET(S): 8.6 TABLET ORAL at 23:00

## 2022-09-22 NOTE — PROGRESS NOTE ADULT - SUBJECTIVE AND OBJECTIVE BOX
SUMMARY:    HOSPITAL COURSE:    24 HOUR EVENTS:     ADMISSION SCORES:   GCS: HH: MF: NIHSS: ICH Score:    SEDATION SCORES:  RASS: CAM-ICU:     REVIEW OF SYSTEMS:    ALLERGIES: Allergies    No Known Allergies    Intolerances        VITALS/DATA/ORDERS: [] Reviewed    DEVICES:   [] Restraints [] PIVs [] ET tube [] central line [] PICC [] arterial line [] pinzon [] NGT/OGT [] EVD [] LD [] LOUISA/HMV [] LiCOX [] ICP monitor [] Trach [] PEG [] Chest Tube [] other:    EXAMINATION:  General: No acute distress  HEENT: Anicteric sclerae  Cardiac: [id number]  Lungs: Clear  Abdomen: Soft, non-tender, +BS  Extremities: No c/c/e  Skin/Incision Site: Clean, dry and intact  Neurologic: Awake, alert, fully oriented, follows commands, PERRL, VFFtc, EOMI, face symmetric, tongue midline, no drift, full strength SUMMARY: 51 year old female presents for preop testing for endonasal and transcranial repair of CSF leak with abdominal fat graft/ lumbar drain for cranial cerebrospinal fluid leak on 9/22/2022. Pt was originally scheduled back in June 2022 for nasal polypectomy and brain MRI revealed a leakage. She c/o chronic sinusitis, occasional "brain fog," no abnormal gait, no weakness, no headaches at present. Her medical history significant for obesity s/p bariatric surgery- gastric sleeve 6/3/2021, heartburn, previous covid-19 (2/2021, symptoms included fever, SOB, headaches, no hospitalization, no intubation). rhinoplasty 8 yrs ago  She denies any symptoms of covid-19, and scheduled for PCR test on 9/19/2022 (15 Sep 2022 16:32)          HOSPITAL COURSE: s/p POD0 large skullbase defect, encephalocele w LD repaired w nasoseptal flap    24 HOUR EVENTS: seen in [facility], has some headache and mlid rinorrhea. LLE weakness      REVIEW OF SYSTEMS: as above    ALLERGIES: Allergies    No Known Allergies    Intolerances        VITALS/DATA/ORDERS: [x] Reviewed    DEVICES:   [] Restraints [x] PIVs [] ET tube [] central line [] PICC [x] arterial line [x] pinzon [] NGT/OGT [] EVD [] LD [] LOUISA/HMV [] LiCOX [] ICP monitor [] Trach [] PEG [] Chest Tube [] other:    EXAMINATION:  General: No acute distress  HEENT: Anicteric sclerae  Cardiac: [id number]  Lungs: Clear  Abdomen: Soft, non-tender, +BS  Extremities: No c/c/e  Skin/Incision Site: Clean, dry and intact  Neurologic: Awake, alert, fully oriented, follows commands, PERRL, VFFtc, EOMI, face symmetric, tongue midline, no drift, full strength

## 2022-09-22 NOTE — PROGRESS NOTE ADULT - PROBLEM SELECTOR PLAN 1
- Monitor for CSF leak.   - Repaired with R. Middle Turb flap/Nasopore/Merocel.   - Will remove Merocel packing on 9/27.   - c/w Nafcillin.   - TSRP Precautions (no incentive spirometry, no straws, no BIPAP)  - Humidified O2 prn.   - Pain meds prn.   - ENT will follow.   - Call with questions.     # [id number]  ENT PA

## 2022-09-22 NOTE — PROGRESS NOTE ADULT - SUBJECTIVE AND OBJECTIVE BOX
ENT ISSUE/POD: S/P Endonasal CSF Leak Repair POD # 0.     HPI: 52 YO with PMH/PSH of obesity s/p bariatric surgery- gastric sleeve 6/3/2021, GERD,  rhinoplasty 8 yrs ago presents with CSF Rhinorrhea . Now S/P Endonasal CSF Leak Repair POD #0. Repaired with Right middle Turb flap andd Nasopore/ Merocel. LD @ 5cc/hr. Pt was evaluated at bedside. C/o frontal HA, 7/10 pain, relieved with pain meds.  Denies salty/Metallic taste in mouth, fever/chills, clear rhinorrhea, cough, N/V.     PAST MEDICAL & SURGICAL HISTORY:  Fibroid  History of migraine headaches  Chronic ethmoidal sinusitis  History of constipation  2019 novel coronavirus disease (COVID-19) 2021  fever, SOB, headaches, vomiting and diarrhea  History of heartburn  Other cranial cerebrospinal fluid leak  H/O nasal polyp   delivery delivered x4  History of colonoscopy x7 years ago  and upper endoscopy  H/O bariatric surgery  gastric sleeve 6/3/2021  prior to surgery weight 230 pounds  History of rhinoplasty x8 years  H/O arthroscopy of knee left knee  H/O tubal ligation x6 years ago    Allergies  No Known Allergies    Intolerances    MEDICATIONS  (STANDING):  ceFAZolin   IVPB 2000 milliGRAM(s) IV Intermittent once  levETIRAcetam  IVPB 500 milliGRAM(s) IV Intermittent every 12 hours  nafcillin  IVPB 2 Gram(s) IV Intermittent every 4 hours  polyethylene glycol 3350 17 Gram(s) Oral daily  senna 2 Tablet(s) Oral at bedtime  sodium chloride 0.9%. 1000 milliLiter(s) (75 mL/Hr) IV Continuous <Continuous>    MEDICATIONS  (PRN):  acetaminophen     Tablet .. 325 milliGRAM(s) Oral every 4 hours PRN Mild Pain (1 - 3)  bisacodyl 5 milliGRAM(s) Oral daily PRN Constipation  metoclopramide Injectable 10 milliGRAM(s) IV Push every 8 hours PRN Nausea and/or Vomiting, second line  ondansetron Injectable 4 milliGRAM(s) IV Push every 6 hours PRN Nausea and/or Vomiting  traMADol 25 milliGRAM(s) Oral every 4 hours PRN Moderate Pain (4 - 6)    Social History: SEE CONSULT.   Family history: SEE CONSULT.     ROS:   ENT: all negative except as noted in HPI   Pulm: denies SOB, cough, hemoptysis  Neuro: denies numbness/tingling, loss of sensation  Endo: denies heat/cold intolerance, excessive sweating    Vital Signs Last 24 Hrs  T(C): 36.6 (22 Sep 2022 19:00), Max: 36.7 (22 Sep 2022 10:34)  T(F): 97.8 (22 Sep 2022 19:00), Max: 98.1 (22 Sep 2022 11:58)  HR: 89 (23 Sep 2022 00:00) (70 - 89)  BP: 138/88 (22 Sep 2022 11:58) (138/88 - 138/88)  BP(mean): --  RR: 16 (23 Sep 2022 00:00) (11 - 19)  SpO2: 99% (23 Sep 2022 00:00) (98% - 100%)  Parameters below as of 22 Sep 2022 21:00  Patient On (Oxygen Delivery Method): room air                        11.3   9.34  )-----------( 245      ( 22 Sep 2022 22:51 )             35.9    09-22    141  |  106  |  11  ----------------------------<  146<H>  4.0   |  25  |  0.59    Ca    8.8      22 Sep 2022 22:51  Phos  3.8     09-22  Mg     2.0     09-22     PHYSICAL EXAM:  Gen: NAD, On Humidified O2.   Skin: No rashes, bruises, or lesions  Head: Normocephalic, Atraumatic  Face: no edema, erythema, or fluctuance. Parotid glands soft without mass  Eyes: no scleral injection  Nose: R Nasopore/ Merocel in place. Crusted blood blood in Left Nare.   No CSF leak noted on exam.   Mouth: No Stridor / Drooling / Trismus.  Mucosa moist, tongue/uvula midline, no bleeding in  posterior oropharynx.  Neck: Flat, supple, no lymphadenopathy, trachea midline, no masses  Lymphatic: No lymphadenopathy  Resp: On Humidified O2.   Neuro: facial nerve intact, no facial droop.

## 2022-09-22 NOTE — PRE-ANESTHESIA EVALUATION ADULT - NSANTHSUBSTSD_GEN_ALL_CORE
50y m PMHx CAD, DM 2, HTN, HLD p/w left foot pain. Pt reports 3 weeks of left foot swelling and pain, primarily to his 5th left toe with overlying yellow crusting lesion and pain. He also reports pain with walking but has been able to walk. His "doctor friend" gave him 2 pills which have helped, he does not know what these pills are. Denies joint swelling, redness, warmth, or limited ROM. Denies numbness, weakness, temperature changes, calf pain/swelling, recent travel, CP, SOB, fever, chills, or discharge. No 50y m PMHx CAD, DM 2, HTN, HLD p/w left foot pain. Pt reports 3 weeks of left foot swelling and pain, primarily to his 5th left toe with overlying yellow crusting lesion and pain. He also reports pain with walking but has been able to walk. His "doctor friend" gave him 2 pills which have helped, he does not know what these pills are. Denies joint swelling, redness, warmth, or limited ROM. Denies numbness, weakness, temperature changes, calf pain/swelling, recent travel, CP, SOB, fever, chills, or discharge. Declined pain meds in ED, took motrin at home.

## 2022-09-22 NOTE — PROGRESS NOTE ADULT - ASSESSMENT
ASSESSMENT: HPI:  51 year old female presents for preop testing for endonasal and transcranial repair of CSF leak with abdominal fat graft/ lumbar drain for cranial cerebrospinal fluid leak on 9/22/2022. Pt was originally scheduled back in June 2022 for nasal polypectomy and brain MRI revealed a leakage. She c/o chronic sinusitis, occasional "brain fog," no abnormal gait, no weakness, no headaches at present. Her medical history significant for obesity s/p bariatric surgery- gastric sleeve 6/3/2021, heartburn, previous covid-19 (2/2021, symptoms included fever, SOB, headaches, no hospitalization, no intubation).  She denies any symptoms of covid-19, and scheduled for PCR test on 9/19/2022 (15 Sep 2022 16:32)      NEURO:  CT Head in AM, MRI post-op, Surgical drains per NSGY, Pain control  Activity: [] OOB as tolerated [] Bedrest [] PT [] OT [] PMNR    PULM:  Incentive spirometry, mobilize as tolerated    CV:  Keep -150mmHg, d/c a-line in AM    RENAL:  IVF until good PO intake, d/c pinzon in AM    GI:  Diet: Dysphagia screen and then advance diet as tolerated  GI prophylaxis [] not indicated [] PPI [] other:  Bowel regimen [] colace [] senna [] other:    ENDO:   Goal euglycemia (-180)    HEME/ONC:  VTE prophylaxis: [] SCDs [] chemoprophylaxis [] hold chemoprophylaxis due to: [] high risk of DVT/PE on admission due to:    ID:  Marly-op antibiotics    MISC:    SOCIAL/FAMILY:  [] awaiting [] updated at bedside [] family meeting    CODE STATUS:  [] Full Code [] DNR [] DNI [] Palliative/Comfort Care    DISPOSITION:  [] ICU [] Stroke Unit [] Floor [] EMU [] RCU [] PCU    [] Patient is at high risk of neurologic deterioration/death due to:     Time seen:  Time spent: ___ [] critical care minutes    Contact: [phone] ASSESSMENT: HPI:  51 year old female presents for preop testing for endonasal and transcranial repair of CSF leak with abdominal fat graft/ lumbar drain for cranial cerebrospinal fluid leak on 9/22/2022. Pt was originally scheduled back in June 2022 for nasal polypectomy and brain MRI revealed a leakage. She c/o chronic sinusitis, occasional "brain fog," no abnormal gait, no weakness, no headaches at present. Her medical history significant for obesity s/p bariatric surgery- gastric sleeve 6/3/2021, heartburn, previous covid-19 (2/2021, symptoms included fever, SOB, headaches, no hospitalization, no intubation). rhinoplasty 8 yrs ago  She denies any symptoms of covid-19, and scheduled for PCR test on 9/19/2022 (15 Sep 2022 16:32)      POD0 large skullbase defect, encephalocele w LD repaired w nasoseptal flap    NEURO:  [id number]  CTH now for new LLE weakness  LD @5cc/hr started 9 pm, further plan per nsgy, got flurescein  CT Head in AM, MRI, Pain control oxy 5/10 tylenol prn  keppra 500 bid sz ppx  HOB 30  Activity: [x] OOB as tolerated [] Bedrest [x] PT [x] OT [] PMNR    PULM: RA, no IS   mobilize as tolerated    CV:  Keep -150mmHg, d/c a-line in AM    RENAL:  IVF until good PO intake, d/c pinzon in AM    GI:  Diet: reg diet  GI prophylaxis [] not indicated [] PPI [] other:  Bowel regimen standing    ENDO:   Goal euglycemia (-180)    HEME/ONC: LED check  VTE prophylaxis: [x] SCDs  hold chemoprophylaxis due to: POD0    ID: got cefazolin x1d  Marly-op antibiotics, nafcillin    MISC:    SOCIAL/FAMILY:  [] awaiting [] updated at bedside [] family meeting    CODE STATUS:  [x] Full Code [] DNR [] DNI [] Palliative/Comfort Care    DISPOSITION:  [x] ICU [] Stroke Unit [] Floor [] EMU [] RCU [] PCU    [] Patient is at high risk of neurologic deterioration/death due to: meningitis, overdrainage brain sag and SDH      Contact: [phone]

## 2022-09-22 NOTE — PROGRESS NOTE ADULT - ASSESSMENT
50 YO with PMH/PSH of obesity s/p bariatric surgery- gastric sleeve 6/3/2021, GERD,  rhinoplasty 8 yrs ago presents with CSF Rhinorrhea . Now S/P Endonasal CSF Leak Repair POD #0. Repaired with Right middle Turb flap andd Nasopore/ Merocel. LD @ 5cc/hr. Pt was evaluated at bedside. C/o frontal HA, 7/10 pain, relieved with pain meds. No CSF Leak noted.

## 2022-09-23 DIAGNOSIS — G96.00 CEREBROSPINAL FLUID LEAK, UNSPECIFIED: ICD-10-CM

## 2022-09-23 LAB
ANION GAP SERPL CALC-SCNC: 8 MMOL/L — SIGNIFICANT CHANGE UP (ref 5–17)
BUN SERPL-MCNC: 8 MG/DL — SIGNIFICANT CHANGE UP (ref 7–23)
CALCIUM SERPL-MCNC: 8.6 MG/DL — SIGNIFICANT CHANGE UP (ref 8.4–10.5)
CHLORIDE SERPL-SCNC: 106 MMOL/L — SIGNIFICANT CHANGE UP (ref 96–108)
CO2 SERPL-SCNC: 26 MMOL/L — SIGNIFICANT CHANGE UP (ref 22–31)
CREAT SERPL-MCNC: 0.61 MG/DL — SIGNIFICANT CHANGE UP (ref 0.5–1.3)
EGFR: 108 ML/MIN/1.73M2 — SIGNIFICANT CHANGE UP
GLUCOSE SERPL-MCNC: 109 MG/DL — HIGH (ref 70–99)
HCT VFR BLD CALC: 31 % — LOW (ref 34.5–45)
HGB BLD-MCNC: 9.8 G/DL — LOW (ref 11.5–15.5)
MAGNESIUM SERPL-MCNC: 1.9 MG/DL — SIGNIFICANT CHANGE UP (ref 1.6–2.6)
MCHC RBC-ENTMCNC: 27.7 PG — SIGNIFICANT CHANGE UP (ref 27–34)
MCHC RBC-ENTMCNC: 31.6 GM/DL — LOW (ref 32–36)
MCV RBC AUTO: 87.6 FL — SIGNIFICANT CHANGE UP (ref 80–100)
NRBC # BLD: 0 /100 WBCS — SIGNIFICANT CHANGE UP (ref 0–0)
PHOSPHATE SERPL-MCNC: 4.1 MG/DL — SIGNIFICANT CHANGE UP (ref 2.5–4.5)
PLATELET # BLD AUTO: 197 K/UL — SIGNIFICANT CHANGE UP (ref 150–400)
POTASSIUM SERPL-MCNC: 3.8 MMOL/L — SIGNIFICANT CHANGE UP (ref 3.5–5.3)
POTASSIUM SERPL-SCNC: 3.8 MMOL/L — SIGNIFICANT CHANGE UP (ref 3.5–5.3)
RBC # BLD: 3.54 M/UL — LOW (ref 3.8–5.2)
RBC # FLD: 14.3 % — SIGNIFICANT CHANGE UP (ref 10.3–14.5)
SODIUM SERPL-SCNC: 140 MMOL/L — SIGNIFICANT CHANGE UP (ref 135–145)
WBC # BLD: 8.84 K/UL — SIGNIFICANT CHANGE UP (ref 3.8–10.5)
WBC # FLD AUTO: 8.84 K/UL — SIGNIFICANT CHANGE UP (ref 3.8–10.5)

## 2022-09-23 PROCEDURE — 70496 CT ANGIOGRAPHY HEAD: CPT | Mod: 26

## 2022-09-23 PROCEDURE — 99291 CRITICAL CARE FIRST HOUR: CPT

## 2022-09-23 PROCEDURE — 70450 CT HEAD/BRAIN W/O DYE: CPT | Mod: 26,59

## 2022-09-23 PROCEDURE — 99024 POSTOP FOLLOW-UP VISIT: CPT

## 2022-09-23 PROCEDURE — 72148 MRI LUMBAR SPINE W/O DYE: CPT | Mod: 26

## 2022-09-23 RX ORDER — POTASSIUM CHLORIDE 20 MEQ
20 PACKET (EA) ORAL ONCE
Refills: 0 | Status: COMPLETED | OUTPATIENT
Start: 2022-09-23 | End: 2022-09-23

## 2022-09-23 RX ORDER — ALPRAZOLAM 0.25 MG
0.5 TABLET ORAL ONCE
Refills: 0 | Status: DISCONTINUED | OUTPATIENT
Start: 2022-09-23 | End: 2022-09-23

## 2022-09-23 RX ORDER — CHLORHEXIDINE GLUCONATE 213 G/1000ML
1 SOLUTION TOPICAL
Refills: 0 | Status: DISCONTINUED | OUTPATIENT
Start: 2022-09-23 | End: 2022-09-26

## 2022-09-23 RX ORDER — POLYETHYLENE GLYCOL 3350 17 G/17G
17 POWDER, FOR SOLUTION ORAL
Refills: 0 | Status: DISCONTINUED | OUTPATIENT
Start: 2022-09-23 | End: 2022-09-27

## 2022-09-23 RX ORDER — DEXMEDETOMIDINE HYDROCHLORIDE IN 0.9% SODIUM CHLORIDE 4 UG/ML
1 INJECTION INTRAVENOUS
Qty: 200 | Refills: 0 | Status: DISCONTINUED | OUTPATIENT
Start: 2022-09-23 | End: 2022-09-24

## 2022-09-23 RX ORDER — ACETAMINOPHEN 500 MG
650 TABLET ORAL EVERY 6 HOURS
Refills: 0 | Status: DISCONTINUED | OUTPATIENT
Start: 2022-09-23 | End: 2022-09-24

## 2022-09-23 RX ORDER — SODIUM CHLORIDE 9 MG/ML
500 INJECTION INTRAMUSCULAR; INTRAVENOUS; SUBCUTANEOUS ONCE
Refills: 0 | Status: COMPLETED | OUTPATIENT
Start: 2022-09-23 | End: 2022-09-23

## 2022-09-23 RX ORDER — TRAMADOL HYDROCHLORIDE 50 MG/1
25 TABLET ORAL ONCE
Refills: 0 | Status: DISCONTINUED | OUTPATIENT
Start: 2022-09-23 | End: 2022-09-23

## 2022-09-23 RX ORDER — TRAMADOL HYDROCHLORIDE 50 MG/1
25 TABLET ORAL EVERY 4 HOURS
Refills: 0 | Status: DISCONTINUED | OUTPATIENT
Start: 2022-09-23 | End: 2022-09-27

## 2022-09-23 RX ORDER — MAGNESIUM SULFATE 500 MG/ML
2 VIAL (ML) INJECTION ONCE
Refills: 0 | Status: COMPLETED | OUTPATIENT
Start: 2022-09-23 | End: 2022-09-23

## 2022-09-23 RX ORDER — TRAMADOL HYDROCHLORIDE 50 MG/1
50 TABLET ORAL EVERY 4 HOURS
Refills: 0 | Status: DISCONTINUED | OUTPATIENT
Start: 2022-09-23 | End: 2022-09-27

## 2022-09-23 RX ORDER — LEVETIRACETAM 250 MG/1
500 TABLET, FILM COATED ORAL
Refills: 0 | Status: DISCONTINUED | OUTPATIENT
Start: 2022-09-23 | End: 2022-09-27

## 2022-09-23 RX ADMIN — NAFCILLIN 200 GRAM(S): 10 INJECTION, POWDER, FOR SOLUTION INTRAVENOUS at 10:03

## 2022-09-23 RX ADMIN — NAFCILLIN 200 GRAM(S): 10 INJECTION, POWDER, FOR SOLUTION INTRAVENOUS at 18:26

## 2022-09-23 RX ADMIN — TRAMADOL HYDROCHLORIDE 25 MILLIGRAM(S): 50 TABLET ORAL at 07:46

## 2022-09-23 RX ADMIN — Medication 325 MILLIGRAM(S): at 10:04

## 2022-09-23 RX ADMIN — Medication 325 MILLIGRAM(S): at 04:15

## 2022-09-23 RX ADMIN — TRAMADOL HYDROCHLORIDE 50 MILLIGRAM(S): 50 TABLET ORAL at 20:47

## 2022-09-23 RX ADMIN — TRAMADOL HYDROCHLORIDE 25 MILLIGRAM(S): 50 TABLET ORAL at 00:45

## 2022-09-23 RX ADMIN — TRAMADOL HYDROCHLORIDE 50 MILLIGRAM(S): 50 TABLET ORAL at 13:00

## 2022-09-23 RX ADMIN — TRAMADOL HYDROCHLORIDE 50 MILLIGRAM(S): 50 TABLET ORAL at 20:17

## 2022-09-23 RX ADMIN — SENNA PLUS 2 TABLET(S): 8.6 TABLET ORAL at 23:00

## 2022-09-23 RX ADMIN — LEVETIRACETAM 500 MILLIGRAM(S): 250 TABLET, FILM COATED ORAL at 17:07

## 2022-09-23 RX ADMIN — NAFCILLIN 200 GRAM(S): 10 INJECTION, POWDER, FOR SOLUTION INTRAVENOUS at 02:55

## 2022-09-23 RX ADMIN — TRAMADOL HYDROCHLORIDE 25 MILLIGRAM(S): 50 TABLET ORAL at 00:15

## 2022-09-23 RX ADMIN — NAFCILLIN 200 GRAM(S): 10 INJECTION, POWDER, FOR SOLUTION INTRAVENOUS at 06:03

## 2022-09-23 RX ADMIN — TRAMADOL HYDROCHLORIDE 25 MILLIGRAM(S): 50 TABLET ORAL at 02:28

## 2022-09-23 RX ADMIN — Medication 25 GRAM(S): at 10:04

## 2022-09-23 RX ADMIN — Medication 325 MILLIGRAM(S): at 03:45

## 2022-09-23 RX ADMIN — SODIUM CHLORIDE 500 MILLILITER(S): 9 INJECTION INTRAMUSCULAR; INTRAVENOUS; SUBCUTANEOUS at 15:30

## 2022-09-23 RX ADMIN — Medication 0.5 MILLIGRAM(S): at 13:40

## 2022-09-23 RX ADMIN — POLYETHYLENE GLYCOL 3350 17 GRAM(S): 17 POWDER, FOR SOLUTION ORAL at 17:07

## 2022-09-23 RX ADMIN — Medication 20 MILLIEQUIVALENT(S): at 10:04

## 2022-09-23 RX ADMIN — TRAMADOL HYDROCHLORIDE 50 MILLIGRAM(S): 50 TABLET ORAL at 14:00

## 2022-09-23 RX ADMIN — NAFCILLIN 200 GRAM(S): 10 INJECTION, POWDER, FOR SOLUTION INTRAVENOUS at 15:30

## 2022-09-23 RX ADMIN — SODIUM CHLORIDE 500 MILLILITER(S): 9 INJECTION INTRAMUSCULAR; INTRAVENOUS; SUBCUTANEOUS at 16:15

## 2022-09-23 RX ADMIN — CHLORHEXIDINE GLUCONATE 1 APPLICATION(S): 213 SOLUTION TOPICAL at 23:00

## 2022-09-23 RX ADMIN — LEVETIRACETAM 400 MILLIGRAM(S): 250 TABLET, FILM COATED ORAL at 05:52

## 2022-09-23 RX ADMIN — Medication 325 MILLIGRAM(S): at 11:00

## 2022-09-23 RX ADMIN — TRAMADOL HYDROCHLORIDE 25 MILLIGRAM(S): 50 TABLET ORAL at 02:58

## 2022-09-23 NOTE — PHYSICAL THERAPY INITIAL EVALUATION ADULT - LEVEL OF INDEPENDENCE: GAIT, REHAB EVAL
Addended by: PASCUAL DAY on: 3/5/2019 05:41 PM     Modules accepted: Orders     minimum assist (75% patients effort)

## 2022-09-23 NOTE — PROGRESS NOTE ADULT - SUBJECTIVE AND OBJECTIVE BOX
SUMMARY: 51 year old female presents for preop testing for endonasal and transcranial repair of CSF leak with abdominal fat graft/ lumbar drain for cranial cerebrospinal fluid leak on 9/22/2022. Pt was originally scheduled back in June 2022 for nasal polypectomy and brain MRI revealed a leakage. She c/o chronic sinusitis, occasional "brain fog," no abnormal gait, no weakness, no headaches at present. Her medical history significant for obesity s/p bariatric surgery- gastric sleeve 6/3/2021, heartburn, previous covid-19 (2/2021, symptoms included fever, SOB, headaches, no hospitalization, no intubation). rhinoplasty 8 yrs ago  She denies any symptoms of covid-19, and scheduled for PCR test on 9/19/2022 (15 Sep 2022 16:32)     9/22 s/p large skullbase defect, encephalocele w LD repaired w nasoseptal flap    24 HOUR EVENTS: seen in NSCU, has some headache and mlid rinorrhea. LLE weakness-->CT/CTA done overnight     REVIEW OF SYSTEMS: [] Unable to Assess due to neurologic exam   [ x] All ROS addressed below are non-contributory, except:  Neuro: [ x] Headache [ ] Back pain [ ] Numbness [ x] Weakness [ ] Ataxia [ ] Dizziness [ ] Aphasia [ ] Dysarthria [ ] Visual disturbance  Resp: [ ] Shortness of breath/dyspnea [ ] Orthopnea [ ] Cough  CV: [ ] Chest pain [ ] Palpitation [ ] Lightheadedness [ ] Syncope  Renal: [ ] Thirst [ ] Edema  GI: [ ] Nausea [ ] Emesis [ ] Abdominal pain [ ] Constipation [ ] Diarrhea  Hem: [ ] Hematemesis [ ] bBright red blood per rectum  ID: [ ] Fever [ ] Chills [ ] Dysuria  ENT: [ ] Rhinorrhea    ALLERGIES: Allergies    No Known Allergies    Intolerances    VITALS/DATA/ORDERS: [x] Reviewed  CTA negative    DEVICES:   [] Restraints [x] PIVs [] ET tube [] central line [] PICC [x] arterial line [] pinzon [] NGT/OGT [] EVD [] LD [] LOUISA/HMV [] LiCOX [] ICP monitor [] Trach [] PEG [] Chest Tube [] other:    EXAMINATION:  General: No acute distress  HEENT: Anicteric sclerae  Cardiac: [id number]  Lungs: Clear  Abdomen: Soft, non-tender, +BS  Extremities: No c/c/e  Skin/Incision Site: Clean, dry and intact  Neurologic: Awake, alert, fully oriented, follows commands, PERRL, EOMI, face symmetric, no drift, BUE 5/5, LLE 3/5, RLE 4/5 SUMMARY: 51 year old female presents for preop testing for endonasal and transcranial repair of CSF leak with abdominal fat graft/ lumbar drain for cranial cerebrospinal fluid leak on 9/22/2022. Pt was originally scheduled back in June 2022 for nasal polypectomy and brain MRI revealed a leakage. She c/o chronic sinusitis, occasional "brain fog," no abnormal gait, no weakness, no headaches at present. Her medical history significant for obesity s/p bariatric surgery- gastric sleeve 6/3/2021, heartburn, previous covid-19 (2/2021, symptoms included fever, SOB, headaches, no hospitalization, no intubation). rhinoplasty 8 yrs ago  She denies any symptoms of covid-19, and scheduled for PCR test on 9/19/2022 (15 Sep 2022 16:32)     9/22 s/p large skullbase defect, encephalocele w LD repaired w nasoseptal flap    24 HOUR EVENTS: hypotensive during MRI, headaches which are not positional and improve slightly w tramadol    REVIEW OF SYSTEMS: [] Unable to Assess due to neurologic exam   [ x] All ROS addressed below are non-contributory, except:  Neuro: [ x] Headache [ ] Back pain [ ] Numbness [ x] Weakness [ ] Ataxia [ ] Dizziness [ ] Aphasia [ ] Dysarthria [ ] Visual disturbance  Resp: [ ] Shortness of breath/dyspnea [ ] Orthopnea [ ] Cough  CV: [ ] Chest pain [ ] Palpitation [ ] Lightheadedness [ ] Syncope  Renal: [ ] Thirst [ ] Edema  GI: [ ] Nausea [ ] Emesis [ ] Abdominal pain [ ] Constipation [ ] Diarrhea  Hem: [ ] Hematemesis [ ] bBright red blood per rectum  ID: [ ] Fever [ ] Chills [ ] Dysuria  ENT: [ ] Rhinorrhea    ALLERGIES: Allergies    No Known Allergies    Intolerances    VITALS/DATA/ORDERS: [x] Reviewed  CTA negative    DEVICES:   [] Restraints [x] PIVs [] ET tube [] central line [] PICC [x] arterial line [] pinzon [] NGT/OGT [] EVD [] LD [] LOUISA/HMV [] LiCOX [] ICP monitor [] Trach [] PEG [] Chest Tube [] other:    EXAMINATION:  General: No acute distress  HEENT: Anicteric sclerae  Cardiac: [id number]  Lungs: Clear  Abdomen: Soft, non-tender, +BS  Extremities: No c/c/e  Skin/Incision Site: Clean, dry and intact  Neurologic: Awake, alert, fully oriented, follows commands, PERRL, EOMI, face symmetric, no drift, BUE 5/5, LLE 3/5, RLE 4/5

## 2022-09-23 NOTE — PHYSICAL THERAPY INITIAL EVALUATION ADULT - PERTINENT HX OF CURRENT PROBLEM, REHAB EVAL
51 year old female presents for endonasal and transcranial repair of CSF leak with abdominal fat graft/ lumbar drain for cranial cerebrospinal fluid leak on 9/22/2022. Pt was originally scheduled back in June 2022 for nasal polypectomy and brain MRI revealed a leakage.

## 2022-09-23 NOTE — PROGRESS NOTE ADULT - ASSESSMENT
50 YO with PMH/PSH of obesity s/p bariatric surgery- gastric sleeve 6/3/2021, GERD,  rhinoplasty 8 yrs ago presents with CSF Rhinorrhea . Now S/P Endonasal CSF Leak Repair POD #1. Repaired with Right middle Turb flap andd Nasopore/ Merocel. LD @ 5cc/hr. Pt was evaluated at bedside. C/o frontal HA, 7/10 pain, relieved with pain meds. No CSF Leak noted.

## 2022-09-23 NOTE — PHYSICAL THERAPY INITIAL EVALUATION ADULT - ACTIVE RANGE OF MOTION EXAMINATION, REHAB EVAL
LLE 3/5/bilateral upper extremity Active ROM was WFL (within functional limits)/bilateral  lower extremity Active ROM was WFL (within functional limits)

## 2022-09-23 NOTE — PROGRESS NOTE ADULT - PROBLEM SELECTOR PLAN 1
·  Plan: - Monitor for CSF leak.   - Repaired with R. Middle Turb flap/Nasopore/Merocel.   - Will remove Merocel packing on 9/27.   - c/w Nafcillin.   - TSRP Precautions (no incentive spirometry, no straws, no BIPAP)  - Humidified O2 prn.   - Pain meds prn.   - ENT will follow.   - Call with questions.

## 2022-09-23 NOTE — PROGRESS NOTE ADULT - SUBJECTIVE AND OBJECTIVE BOX
ENT ISSUE/POD: S/P Endonasal CSF Leak Repair POD # 1.    HPI: 52 YO with PMH/PSH of obesity s/p bariatric surgery- gastric sleeve 6/3/2021, GERD,  rhinoplasty 8 yrs ago presents with CSF Rhinorrhea . Now S/P Endonasal CSF Leak Repair POD #1. Repaired with Right middle Turb flap andd Nasopore/ Merocel. LD @ 5cc/hr. Pt was evaluated at bedside. C/o frontal HA, 7/10 pain, relieved with pain meds.  Denies salty/Metallic taste in mouth, fever/chills, clear rhinorrhea, cough, N/V.           PAST MEDICAL & SURGICAL HISTORY:  Fibroid      History of migraine headaches      Chronic ethmoidal sinusitis      History of constipation      2019 novel coronavirus disease (COVID-19)  2021  fever, SOB, headaches, vomiting and diarrhea      History of heartburn      Other cranial cerebrospinal fluid leak      H/O nasal polyp       delivery delivered  x4      History of colonoscopy  x7 years ago  and upper endoscopy      H/O bariatric surgery  gastric sleeve   6/3/2021  prior to surgery weight 230 pounds      History of rhinoplasty  x8 years      H/O arthroscopy of knee  left knee      H/O tubal ligation  x6 years ago        Allergies    No Known Allergies    Intolerances      MEDICATIONS  (STANDING):  ceFAZolin   IVPB 2000 milliGRAM(s) IV Intermittent once  levETIRAcetam  IVPB 500 milliGRAM(s) IV Intermittent every 12 hours  nafcillin  IVPB 2 Gram(s) IV Intermittent every 4 hours  polyethylene glycol 3350 17 Gram(s) Oral daily  senna 2 Tablet(s) Oral at bedtime  sodium chloride 0.9%. 1000 milliLiter(s) (75 mL/Hr) IV Continuous <Continuous>    MEDICATIONS  (PRN):  acetaminophen     Tablet .. 325 milliGRAM(s) Oral every 4 hours PRN Mild Pain (1 - 3)  bisacodyl 5 milliGRAM(s) Oral daily PRN Constipation  metoclopramide Injectable 10 milliGRAM(s) IV Push every 8 hours PRN Nausea and/or Vomiting, second line  ondansetron Injectable 4 milliGRAM(s) IV Push every 6 hours PRN Nausea and/or Vomiting  traMADol 25 milliGRAM(s) Oral every 4 hours PRN Moderate Pain (4 - 6)      Social History: see consult    Family history: see consult    ROS:   ENT: all negative except as noted in HPI   Pulm: denies SOB, cough, hemoptysis  Neuro: denies numbness/tingling, loss of sensation  Endo: denies heat/cold intolerance, excessive sweating      Vital Signs Last 24 Hrs  T(C): 36.5 (23 Sep 2022 03:00), Max: 36.7 (22 Sep 2022 10:34)  T(F): 97.7 (23 Sep 2022 03:00), Max: 98.1 (22 Sep 2022 11:58)  HR: 59 (23 Sep 2022 06:00) (59 - 89)  BP: 138/88 (22 Sep 2022 11:58) (138/88 - 138/88)  BP(mean): --  RR: 11 (23 Sep 2022 06:00) (10 - 19)  SpO2: 100% (23 Sep 2022 06:00) (98% - 100%)    Parameters below as of 22 Sep 2022 21:00  Patient On (Oxygen Delivery Method): room air                              11.3   9.34  )-----------( 245      ( 22 Sep 2022 22:51 )             35.9    09-22    141  |  106  |  11  ----------------------------<  146<H>  4.0   |  25  |  0.59    Ca    8.8      22 Sep 2022 22:51  Phos  3.8     09-22  Mg     2.0     09-22         PHYSICAL EXAM:  Gen: NAD, On Humidified O2.   Skin: No rashes, bruises, or lesions  Head: Normocephalic, Atraumatic  Face: no edema, erythema, or fluctuance. Parotid glands soft without mass  Eyes: no scleral injection  Nose: R Nasopore/ Merocel in place. Crusted blood blood in Left Nare.  Mustache drsg in place. No CSF leak noted on exam.   Mouth: No Stridor / Drooling / Trismus.  Mucosa moist, tongue/uvula midline, no bleeding in  posterior oropharynx.  Neck: Flat, supple, no lymphadenopathy, trachea midline, no masses  Lymphatic: No lymphadenopathy  Resp: On Humidified O2.   Neuro: facial nerve intact, no facial droop.

## 2022-09-23 NOTE — PROGRESS NOTE ADULT - ASSESSMENT
ASSESSMENT: HPI:  51 year old female presents for preop testing for endonasal and transcranial repair of CSF leak with abdominal fat graft/ lumbar drain for cranial cerebrospinal fluid leak on 9/22/2022. Pt was originally scheduled back in June 2022 for nasal polypectomy and brain MRI revealed a leakage. She c/o chronic sinusitis, occasional "brain fog," no abnormal gait, no weakness, no headaches at present. Her medical history significant for obesity s/p bariatric surgery- gastric sleeve 6/3/2021, heartburn, previous covid-19 (2/2021, symptoms included fever, SOB, headaches, no hospitalization, no intubation). rhinoplasty 8 yrs ago  She denies any symptoms of covid-19, and scheduled for PCR test on 9/19/2022 (15 Sep 2022 16:32)      POD1 large skullbase defect, encephalocele w LD repaired w nasoseptal flap    NEURO:  [id number]  LD @5cc/hr per neurosurgery x3d  tramadol prn for pain control  MRI spine   keppra 500 bid sz ppx x7d  HOB 30  Activity: [x] OOB as tolerated [] Bedrest [x] PT [x] OT [] PMNR    PULM: RA, no IS  mobilize as tolerated    CV:  Keep -150mmHg, d/c a-line in AM    RENAL:  IVL    GI:  Diet: reg diet  GI prophylaxis [x] not indicated [] PPI [] other:  Bowel regimen standing    ENDO:   Goal euglycemia (-180)    HEME/ONC: LED check  VTE prophylaxis: [x] SCDs  hold lovenox ppx fresh post op    ID:  Marly-op antibiotics, nafcillin    MISC:    SOCIAL/FAMILY:  [x] awaiting [] updated at bedside [] family meeting    CODE STATUS:  [x] Full Code [] DNR [] DNI [] Palliative/Comfort Care    DISPOSITION:  [x] ICU [] Stroke Unit [] Floor [] EMU [] RCU [] PCU    [x] Patient is at high risk of neurologic deterioration/death due to: meningitis, overdrainage brain sag and SDH      Contact: [phone]

## 2022-09-23 NOTE — PROGRESS NOTE ADULT - ASSESSMENT
51 year old female s/p endonasal and transcranial repair of CSF leak with abdominal fat graft/ lumbar drain for cranial cerebrospinal fluid leak.  NSCU, q1h neuro checks  CTH now for LLE weakness  LD @ 5cc/hr starting 9pm

## 2022-09-23 NOTE — PHYSICAL THERAPY INITIAL EVALUATION ADULT - ADDITIONAL COMMENTS
as per pt: PTA pt was living in a PH + 0 steps 1st floor set up. and was independent in all functional mobility and ADL's. no AD for gait. lives with children

## 2022-09-23 NOTE — PROGRESS NOTE ADULT - SUBJECTIVE AND OBJECTIVE BOX
Patient seen and examined at bedside.    --Anticoagulation--    T(C): 36 (09-22-22 @ 18:15), Max: 36.7 (09-22-22 @ 10:34)  HR: 86 (09-22-22 @ 22:00) (70 - 89)  BP: 138/88 (09-22-22 @ 11:58) (138/88 - 138/88)  RR: 15 (09-22-22 @ 22:00) (11 - 19)  SpO2: 98% (09-22-22 @ 22:00) (98% - 100%)  Wt(kg): --    Exam:  AOX3, PERLL, UE 4+/5  effort dependent, RLE AG, LLE slightly AG with assistance, RAYMONDT

## 2022-09-23 NOTE — PROGRESS NOTE ADULT - ASSESSMENT
ASSESSMENT: HPI:  51 year old female presents for preop testing for endonasal and transcranial repair of CSF leak with abdominal fat graft/ lumbar drain for cranial cerebrospinal fluid leak on 9/22/2022. Pt was originally scheduled back in June 2022 for nasal polypectomy and brain MRI revealed a leakage. She c/o chronic sinusitis, occasional "brain fog," no abnormal gait, no weakness, no headaches at present. Her medical history significant for obesity s/p bariatric surgery- gastric sleeve 6/3/2021, heartburn, previous covid-19 (2/2021, symptoms included fever, SOB, headaches, no hospitalization, no intubation). rhinoplasty 8 yrs ago  She denies any symptoms of covid-19, and scheduled for PCR test on 9/19/2022 (15 Sep 2022 16:32)      POD1 large skullbase defect, encephalocele w LD repaired w nasoseptal flap    NEURO:  [id number]  LD @5cc/hr per neurosurgery x3d  tramadol prn for pain control  MRI spine   keppra 500 bid sz ppx x7d  HOB 30  Activity: [x] OOB as tolerated [] Bedrest [x] PT [x] OT [] PMNR    PULM: RA, no IS  mobilize as tolerated    CV:  Keep -150mmHg, d/c a-line in AM    RENAL:  IVL    GI:  Diet: reg diet  GI prophylaxis [x] not indicated [] PPI [] other:  Bowel regimen standing    ENDO:   Goal euglycemia (-180)    HEME/ONC: LED check  VTE prophylaxis: [x] SCDs  hold lovenox ppx fresh post op    ID:  Marly-op antibiotics, nafcillin    MISC:    SOCIAL/FAMILY:  [x] awaiting [] updated at bedside [] family meeting    CODE STATUS:  [x] Full Code [] DNR [] DNI [] Palliative/Comfort Care    DISPOSITION:  [x] ICU [] Stroke Unit [] Floor [] EMU [] RCU [] PCU    [x] Patient is at high risk of neurologic deterioration/death due to: meningitis, overdrainage brain sag and SDH      Contact: [phone] ASSESSMENT:   51 year old female presents for preop testing for endonasal and transcranial repair of CSF leak with abdominal fat graft/ lumbar drain for cranial cerebrospinal fluid leak on 9/22/2022. Pt was originally scheduled back in June 2022 for nasal polypectomy and brain MRI revealed a leakage. She c/o chronic sinusitis, occasional "brain fog," no abnormal gait, no weakness, no headaches at present. Her medical history significant for obesity s/p bariatric surgery- gastric sleeve 6/3/2021, heartburn, previous covid-19 (2/2021, symptoms included fever, SOB, headaches, no hospitalization, no intubation). rhinoplasty 8 yrs ago  She denies any symptoms of covid-19, and scheduled for PCR test on 9/19/2022 (15 Sep 2022 16:32)      POD1 large skullbase defect, encephalocele w LD repaired w nasoseptal flap    NEURO:  [id number]  LD @5cc/hr per neurosurgery x3d  tramadol prn for pain control, tylenol IV  MRI spine showed no paraspinal fluid  keppra 500 bid sz ppx x7d  HOB 30  Activity: [x] OOB as tolerated [] Bedrest [x] PT [x] OT [] PMNR    PULM: RA, no IS  mobilize as tolerated    CV:  Keep -150mmHg, d/c a-line     RENAL:  IVL    GI:  Diet: reg diet  GI prophylaxis [x] not indicated [] PPI [] other:  Bowel regimen standing     ENDO:   Goal euglycemia (-180)    HEME/ONC: LED check  VTE prophylaxis: [x] SCDs  hold lovenox ppx fresh post op    ID:  Marly-op antibiotics, nafcillin    MISC:    SOCIAL/FAMILY:  [x] awaiting [] updated at bedside [] family meeting    CODE STATUS:  [x] Full Code [] DNR [] DNI [] Palliative/Comfort Care    DISPOSITION:  [x] ICU [] Stroke Unit [] Floor [] EMU [] RCU [] PCU    [x] Patient is at high risk of neurologic deterioration/death due to: meningitis, overdrainage brain sag and SDH      Contact: [phone]

## 2022-09-23 NOTE — PROGRESS NOTE ADULT - SUBJECTIVE AND OBJECTIVE BOX
SUMMARY: 51 year old female presents for preop testing for endonasal and transcranial repair of CSF leak with abdominal fat graft/ lumbar drain for cranial cerebrospinal fluid leak on 9/22/2022. Pt was originally scheduled back in June 2022 for nasal polypectomy and brain MRI revealed a leakage. She c/o chronic sinusitis, occasional "brain fog," no abnormal gait, no weakness, no headaches at present. Her medical history significant for obesity s/p bariatric surgery- gastric sleeve 6/3/2021, heartburn, previous covid-19 (2/2021, symptoms included fever, SOB, headaches, no hospitalization, no intubation). rhinoplasty 8 yrs ago  She denies any symptoms of covid-19, and scheduled for PCR test on 9/19/2022 (15 Sep 2022 16:32)     9/22 s/p large skullbase defect, encephalocele w LD repaired w nasoseptal flap    24 HOUR EVENTS: seen in NSCU, has some headache and mlid rinorrhea. LLE weakness-->CT/CTA done overnight     REVIEW OF SYSTEMS: [] Unable to Assess due to neurologic exam   [ x] All ROS addressed below are non-contributory, except:  Neuro: [ x] Headache [ ] Back pain [ ] Numbness [ x] Weakness [ ] Ataxia [ ] Dizziness [ ] Aphasia [ ] Dysarthria [ ] Visual disturbance  Resp: [ ] Shortness of breath/dyspnea [ ] Orthopnea [ ] Cough  CV: [ ] Chest pain [ ] Palpitation [ ] Lightheadedness [ ] Syncope  Renal: [ ] Thirst [ ] Edema  GI: [ ] Nausea [ ] Emesis [ ] Abdominal pain [ ] Constipation [ ] Diarrhea  Hem: [ ] Hematemesis [ ] bBright red blood per rectum  ID: [ ] Fever [ ] Chills [ ] Dysuria  ENT: [ ] Rhinorrhea    ALLERGIES: Allergies    No Known Allergies    Intolerances    VITALS/DATA/ORDERS: [x] Reviewed  CTA negative    DEVICES:   [] Restraints [x] PIVs [] ET tube [] central line [] PICC [x] arterial line [] pinzon [] NGT/OGT [] EVD [] LD [] LOUISA/HMV [] LiCOX [] ICP monitor [] Trach [] PEG [] Chest Tube [] other:    EXAMINATION:  General: No acute distress  HEENT: Anicteric sclerae  Cardiac: [id number]  Lungs: Clear  Abdomen: Soft, non-tender, +BS  Extremities: No c/c/e  Skin/Incision Site: Clean, dry and intact  Neurologic: Awake, alert, fully oriented, follows commands, PERRL, EOMI, face symmetric, no drift, BUE 5/5, LLE 3/5, RLE 4/5

## 2022-09-24 LAB
ANION GAP SERPL CALC-SCNC: 7 MMOL/L — SIGNIFICANT CHANGE UP (ref 5–17)
BUN SERPL-MCNC: 10 MG/DL — SIGNIFICANT CHANGE UP (ref 7–23)
CALCIUM SERPL-MCNC: 8.4 MG/DL — SIGNIFICANT CHANGE UP (ref 8.4–10.5)
CHLORIDE SERPL-SCNC: 109 MMOL/L — HIGH (ref 96–108)
CO2 SERPL-SCNC: 26 MMOL/L — SIGNIFICANT CHANGE UP (ref 22–31)
CREAT SERPL-MCNC: 0.6 MG/DL — SIGNIFICANT CHANGE UP (ref 0.5–1.3)
EGFR: 109 ML/MIN/1.73M2 — SIGNIFICANT CHANGE UP
GLUCOSE SERPL-MCNC: 115 MG/DL — HIGH (ref 70–99)
HCT VFR BLD CALC: 32.7 % — LOW (ref 34.5–45)
HGB BLD-MCNC: 10.2 G/DL — LOW (ref 11.5–15.5)
MAGNESIUM SERPL-MCNC: 2.2 MG/DL — SIGNIFICANT CHANGE UP (ref 1.6–2.6)
MCHC RBC-ENTMCNC: 27.7 PG — SIGNIFICANT CHANGE UP (ref 27–34)
MCHC RBC-ENTMCNC: 31.2 GM/DL — LOW (ref 32–36)
MCV RBC AUTO: 88.9 FL — SIGNIFICANT CHANGE UP (ref 80–100)
NRBC # BLD: 0 /100 WBCS — SIGNIFICANT CHANGE UP (ref 0–0)
PHOSPHATE SERPL-MCNC: 2.6 MG/DL — SIGNIFICANT CHANGE UP (ref 2.5–4.5)
PLATELET # BLD AUTO: 196 K/UL — SIGNIFICANT CHANGE UP (ref 150–400)
POTASSIUM SERPL-MCNC: 4.1 MMOL/L — SIGNIFICANT CHANGE UP (ref 3.5–5.3)
POTASSIUM SERPL-SCNC: 4.1 MMOL/L — SIGNIFICANT CHANGE UP (ref 3.5–5.3)
RBC # BLD: 3.68 M/UL — LOW (ref 3.8–5.2)
RBC # FLD: 14.6 % — HIGH (ref 10.3–14.5)
SODIUM SERPL-SCNC: 142 MMOL/L — SIGNIFICANT CHANGE UP (ref 135–145)
SP GR UR STRIP: 1.01 — SIGNIFICANT CHANGE UP (ref 1.01–1.02)
WBC # BLD: 6.89 K/UL — SIGNIFICANT CHANGE UP (ref 3.8–10.5)
WBC # FLD AUTO: 6.89 K/UL — SIGNIFICANT CHANGE UP (ref 3.8–10.5)

## 2022-09-24 PROCEDURE — 99233 SBSQ HOSP IP/OBS HIGH 50: CPT

## 2022-09-24 PROCEDURE — 99232 SBSQ HOSP IP/OBS MODERATE 35: CPT | Mod: 25

## 2022-09-24 RX ORDER — DEXAMETHASONE 0.5 MG/5ML
4 ELIXIR ORAL ONCE
Refills: 0 | Status: COMPLETED | OUTPATIENT
Start: 2022-09-24 | End: 2022-09-25

## 2022-09-24 RX ORDER — ENOXAPARIN SODIUM 100 MG/ML
40 INJECTION SUBCUTANEOUS
Refills: 0 | Status: DISCONTINUED | OUTPATIENT
Start: 2022-09-24 | End: 2022-09-27

## 2022-09-24 RX ORDER — OXYCODONE HYDROCHLORIDE 5 MG/1
2.5 TABLET ORAL ONCE
Refills: 0 | Status: DISCONTINUED | OUTPATIENT
Start: 2022-09-24 | End: 2022-09-24

## 2022-09-24 RX ORDER — ACETAMINOPHEN 500 MG
1000 TABLET ORAL EVERY 6 HOURS
Refills: 0 | Status: DISCONTINUED | OUTPATIENT
Start: 2022-09-24 | End: 2022-09-27

## 2022-09-24 RX ORDER — ACETAMINOPHEN 500 MG
325 TABLET ORAL ONCE
Refills: 0 | Status: DISCONTINUED | OUTPATIENT
Start: 2022-09-24 | End: 2022-09-24

## 2022-09-24 RX ORDER — CEFAZOLIN SODIUM 1 G
1000 VIAL (EA) INJECTION EVERY 8 HOURS
Refills: 0 | Status: DISCONTINUED | OUTPATIENT
Start: 2022-09-24 | End: 2022-09-26

## 2022-09-24 RX ORDER — SODIUM CHLORIDE 0.65 %
1 AEROSOL, SPRAY (ML) NASAL THREE TIMES A DAY
Refills: 0 | Status: DISCONTINUED | OUTPATIENT
Start: 2022-09-24 | End: 2022-09-27

## 2022-09-24 RX ORDER — SODIUM,POTASSIUM PHOSPHATES 278-250MG
1 POWDER IN PACKET (EA) ORAL ONCE
Refills: 0 | Status: DISCONTINUED | OUTPATIENT
Start: 2022-09-24 | End: 2022-09-24

## 2022-09-24 RX ORDER — METOCLOPRAMIDE HCL 10 MG
5 TABLET ORAL ONCE
Refills: 0 | Status: COMPLETED | OUTPATIENT
Start: 2022-09-24 | End: 2022-09-25

## 2022-09-24 RX ADMIN — Medication 5 MILLIGRAM(S): at 12:36

## 2022-09-24 RX ADMIN — Medication 650 MILLIGRAM(S): at 06:00

## 2022-09-24 RX ADMIN — Medication 1000 MILLIGRAM(S): at 18:17

## 2022-09-24 RX ADMIN — POLYETHYLENE GLYCOL 3350 17 GRAM(S): 17 POWDER, FOR SOLUTION ORAL at 18:18

## 2022-09-24 RX ADMIN — Medication 650 MILLIGRAM(S): at 00:00

## 2022-09-24 RX ADMIN — Medication 1000 MILLIGRAM(S): at 12:36

## 2022-09-24 RX ADMIN — POLYETHYLENE GLYCOL 3350 17 GRAM(S): 17 POWDER, FOR SOLUTION ORAL at 05:18

## 2022-09-24 RX ADMIN — LEVETIRACETAM 500 MILLIGRAM(S): 250 TABLET, FILM COATED ORAL at 18:18

## 2022-09-24 RX ADMIN — TRAMADOL HYDROCHLORIDE 50 MILLIGRAM(S): 50 TABLET ORAL at 02:30

## 2022-09-24 RX ADMIN — CHLORHEXIDINE GLUCONATE 1 APPLICATION(S): 213 SOLUTION TOPICAL at 22:49

## 2022-09-24 RX ADMIN — Medication 100 MILLIGRAM(S): at 13:22

## 2022-09-24 RX ADMIN — Medication 650 MILLIGRAM(S): at 06:30

## 2022-09-24 RX ADMIN — Medication 100 MILLIGRAM(S): at 22:49

## 2022-09-24 RX ADMIN — ENOXAPARIN SODIUM 40 MILLIGRAM(S): 100 INJECTION SUBCUTANEOUS at 18:17

## 2022-09-24 RX ADMIN — ONDANSETRON 4 MILLIGRAM(S): 8 TABLET, FILM COATED ORAL at 06:37

## 2022-09-24 RX ADMIN — Medication 1000 MILLIGRAM(S): at 19:04

## 2022-09-24 RX ADMIN — LEVETIRACETAM 500 MILLIGRAM(S): 250 TABLET, FILM COATED ORAL at 05:24

## 2022-09-24 RX ADMIN — Medication 650 MILLIGRAM(S): at 00:30

## 2022-09-24 RX ADMIN — Medication 1000 MILLIGRAM(S): at 13:30

## 2022-09-24 RX ADMIN — TRAMADOL HYDROCHLORIDE 50 MILLIGRAM(S): 50 TABLET ORAL at 02:00

## 2022-09-24 RX ADMIN — SENNA PLUS 2 TABLET(S): 8.6 TABLET ORAL at 22:49

## 2022-09-24 NOTE — PROGRESS NOTE ADULT - SUBJECTIVE AND OBJECTIVE BOX
ENT ISSUE/POD: S/P Endonasal CSF Leak Repair POD #2    HPI: 52 YO with PMH/PSH of obesity s/p bariatric surgery- gastric sleeve 6/3/2021, GERD, rhinoplasty 8 yrs ago presents with CSF Rhinorrhea . Now S/P Endonasal CSF Leak Repair POD #2. Repaired with Right middle Turb flap and Nasopore/Merocel. LD @ 5cc/hr. Pt was evaluated at bedside. C/o frontal HA, 7/10 pain, relieved with pain meds.  Denies salty/Metallic taste in mouth, fever/chills, clear rhinorrhea, cough, N/V.         PAST MEDICAL & SURGICAL HISTORY:  Fibroid      History of migraine headaches      Chronic ethmoidal sinusitis      History of constipation      2019 novel coronavirus disease (COVID-19)  2021  fever, SOB, headaches, vomiting and diarrhea      History of heartburn      Other cranial cerebrospinal fluid leak      H/O nasal polyp       delivery delivered  x4      History of colonoscopy  x7 years ago  and upper endoscopy      H/O bariatric surgery  gastric sleeve   6/3/2021  prior to surgery weight 230 pounds      History of rhinoplasty  x8 years      H/O arthroscopy of knee  left knee      H/O tubal ligation  x6 years ago        Allergies    No Known Allergies    Intolerances      MEDICATIONS  (STANDING):  bisacodyl 5 milliGRAM(s) Oral daily  ceFAZolin   IVPB 1000 milliGRAM(s) IV Intermittent every 8 hours  chlorhexidine 4% Liquid 1 Application(s) Topical <User Schedule>  enoxaparin Injectable 40 milliGRAM(s) SubCutaneous <User Schedule>  levETIRAcetam 500 milliGRAM(s) Oral two times a day  polyethylene glycol 3350 17 Gram(s) Oral two times a day  senna 2 Tablet(s) Oral at bedtime    MEDICATIONS  (PRN):  acetaminophen     Tablet .. 1000 milliGRAM(s) Oral every 6 hours PRN Temp greater or equal to 38C (100.4F), Mild Pain (1 - 3)  metoclopramide Injectable 10 milliGRAM(s) IV Push every 8 hours PRN Nausea and/or Vomiting, second line  ondansetron Injectable 4 milliGRAM(s) IV Push every 6 hours PRN Nausea and/or Vomiting  sodium chloride 0.65% Nasal 1 Spray(s) Both Nostrils three times a day PRN Nasal Congestion  traMADol 25 milliGRAM(s) Oral every 4 hours PRN Moderate Pain (4 - 6)  traMADol 50 milliGRAM(s) Oral every 4 hours PRN Severe Pain (7 - 10)      Social History: see consult    Family history: see consult    ROS:   ENT: all negative except as noted in HPI   Pulm: denies SOB, cough, hemoptysis  Neuro: denies numbness/tingling, loss of sensation  Endo: denies heat/cold intolerance, excessive sweating      Vital Signs Last 24 Hrs  T(C): 36.6 (24 Sep 2022 11:00), Max: 37.1 (23 Sep 2022 15:15)  T(F): 97.9 (24 Sep 2022 11:00), Max: 98.8 (23 Sep 2022 15:15)  HR: 60 (24 Sep 2022 12:00) (53 - 84)  BP: 152/76 (24 Sep 2022 12:00) (83/49 - 152/76)  BP(mean): 95 (24 Sep 2022 12:00) (57 - 95)  RR: 14 (24 Sep 2022 12:00) (11 - 24)  SpO2: 100% (24 Sep 2022 12:00) (96% - 100%)    Parameters below as of 24 Sep 2022 03:00  Patient On (Oxygen Delivery Method): room air                              10.2   6.89  )-----------( 196      ( 24 Sep 2022 01:48 )             32.7    09-24    142  |  109<H>  |  10  ----------------------------<  115<H>  4.1   |  26  |  0.60    Ca    8.4      24 Sep 2022 01:48  Phos  2.6     09-24  Mg     2.2     09-24       PHYSICAL EXAM:  Gen: NAD, On Humidified O2.   Skin: No rashes, bruises, or lesions  Head: Normocephalic, Atraumatic  Face: no edema, erythema, or fluctuance. Parotid glands soft without mass  Eyes: no scleral injection  Nose: R Nasopore/ Merocel in place. Crusted blood blood in Left Nare.  Mustache drsg in place. No CSF leak noted on exam.   Mouth: No Stridor / Drooling / Trismus.  Mucosa moist, tongue/uvula midline, no bleeding in  posterior oropharynx.  Neck: Flat, supple, no lymphadenopathy, trachea midline, no masses  Lymphatic: No lymphadenopathy  Resp: On Humidified O2.   Neuro: facial nerve intact, no facial droop.

## 2022-09-24 NOTE — PROGRESS NOTE ADULT - ASSESSMENT
51 year old female s/p endonasal and transcranial repair of CSF leak with abdominal fat graft/ lumbar drain for cranial cerebrospinal fluid leak.    LD x 3 days  LED-p  ?SQL

## 2022-09-24 NOTE — PROGRESS NOTE ADULT - SUBJECTIVE AND OBJECTIVE BOX
51 year old female presents for preop testing for endonasal and transcranial repair of CSF leak with abdominal fat graft/ lumbar drain for cranial cerebrospinal fluid leak on 9/22/2022. Pt was originally scheduled back in June 2022 for nasal polypectomy and brain MRI revealed a leakage. She c/o chronic sinusitis, occasional "brain fog," no abnormal gait, no weakness, no headaches at present. Her medical history significant for obesity s/p bariatric surgery- gastric sleeve 6/3/2021, heartburn, previous covid-19 (2/2021, symptoms included fever, SOB, headaches, no hospitalization, no intubation). rhinoplasty 8 yrs ago  She denies any symptoms of covid-19, and scheduled for PCR test on 9/19/2022 (15 Sep 2022 16:32)     9/22 s/p large skullbase defect, encephalocele w LD repaired w nasoseptal flap    24 HOUR EVENTS:       REVIEW OF SYSTEMS: [] Unable to Assess due to neurologic exam   [ x] All ROS addressed below are non-contributory, except:  Neuro: [ x] Headache [ ] Back pain [ ] Numbness [ x] Weakness [ ] Ataxia [ ] Dizziness [ ] Aphasia [ ] Dysarthria [ ] Visual disturbance  Resp: [ ] Shortness of breath/dyspnea [ ] Orthopnea [ ] Cough  CV: [ ] Chest pain [ ] Palpitation [ ] Lightheadedness [ ] Syncope  Renal: [ ] Thirst [ ] Edema  GI: [ ] Nausea [ ] Emesis [ ] Abdominal pain [ ] Constipation [ ] Diarrhea  Hem: [ ] Hematemesis [ ] bBright red blood per rectum  ID: [ ] Fever [ ] Chills [ ] Dysuria  ENT: [ ] Rhinorrhea    ALLERGIES: Allergies    No Known Allergies    Intolerances        T(C): 36.8 (09-24-22 @ 03:00), Max: 37.1 (09-23-22 @ 15:15)  HR: 62 (09-24-22 @ 06:00) (60 - 84)  BP: 118/67 (09-24-22 @ 06:00) (83/49 - 134/82)  RR: 13 (09-24-22 @ 06:00) (11 - 24)  SpO2: 100% (09-24-22 @ 06:00) (96% - 100%)  09-23-22 @ 07:01 - 09-24-22 @ 07:00  --------------------------------------------------------  IN: 2305 mL / OUT: 2175 mL / NET: 130 mL    acetaminophen     Tablet .. 1000 milliGRAM(s) Oral every 6 hours PRN  acetaminophen     Tablet .. 325 milliGRAM(s) Oral once  bisacodyl 5 milliGRAM(s) Oral daily PRN  chlorhexidine 4% Liquid 1 Application(s) Topical <User Schedule>  enoxaparin Injectable 40 milliGRAM(s) SubCutaneous <User Schedule>  levETIRAcetam 500 milliGRAM(s) Oral two times a day  metoclopramide Injectable 10 milliGRAM(s) IV Push every 8 hours PRN  ondansetron Injectable 4 milliGRAM(s) IV Push every 6 hours PRN  polyethylene glycol 3350 17 Gram(s) Oral two times a day  senna 2 Tablet(s) Oral at bedtime  traMADol 25 milliGRAM(s) Oral every 4 hours PRN  traMADol 50 milliGRAM(s) Oral every 4 hours PRN      DEVICES:   [] Restraints [x] PIVs [] ET tube [] central line [] PICC [x] arterial line [] pinzon [] NGT/OGT [] EVD [] LD [] LOUISA/HMV [] LiCOX [] ICP monitor [] Trach [] PEG [] Chest Tube [] other:    EXAMINATION:  General: No acute distress  HEENT: Anicteric sclerae  Cardiac: [id number]  Lungs: Clear  Abdomen: Soft, non-tender, +BS  Extremities: No c/c/e  Skin/Incision Site: Clean, dry and intact  Neurologic: Awake, alert, fully oriented, follows commands, PERRL, EOMI, face symmetric, no drift, BUE 5/5, LLE 3/5, RLE 4/5    Assessment and Plan:   · Assessment	  ASSESSMENT:   51 year old female presents for preop testing for endonasal and transcranial repair of CSF leak with abdominal fat graft/ lumbar drain for cranial cerebrospinal fluid leak on 9/22/2022. Pt was originally scheduled back in June 2022 for nasal polypectomy and brain MRI revealed a leakage. She c/o chronic sinusitis, occasional "brain fog," no abnormal gait, no weakness, no headaches at present. Her medical history significant for obesity s/p bariatric surgery- gastric sleeve 6/3/2021, heartburn, previous covid-19 (2/2021, symptoms included fever, SOB, headaches, no hospitalization, no intubation). rhinoplasty 8 yrs ago  She denies any symptoms of covid-19, and scheduled for PCR test on 9/19/2022 (15 Sep 2022 16:32)      POD1 large skullbase defect, encephalocele w LD repaired w nasoseptal flap    NEURO:  [id number]  LD @5cc/hr per neurosurgery x3d  tramadol prn for pain control, tylenol IV  MRI spine showed no paraspinal fluid  keppra 500 bid sz ppx x7d  HOB 30  Activity: [x] OOB as tolerated [] Bedrest [x] PT [x] OT [] PMNR    PULM: RA, no IS  mobilize as tolerated    CV:  Keep -150mmHg, d/c a-line     RENAL:  IVL    GI:  Diet: reg diet  GI prophylaxis [x] not indicated [] PPI [] other:  Bowel regimen standing     ENDO:   Goal euglycemia (-180)    HEME/ONC: LED check  VTE prophylaxis: [x] SCDs  hold lovenox ppx fresh post op    ID:  Marly-op antibiotics, nafcillin    MISC:    SOCIAL/FAMILY:  [x] awaiting [] updated at bedside [] family meeting    CODE STATUS:  [x] Full Code [] DNR [] DNI [] Palliative/Comfort Care    DISPOSITION:  [x] ICU [] Stroke Unit [] Floor [] EMU [] RCU [] PCU    [x] Patient is at high risk of neurologic deterioration/death due to: meningitis, overdrainage brain sag and SDH      Contact: [phone]      51 year old female presents for preop testing for endonasal and transcranial repair of CSF leak with abdominal fat graft/ lumbar drain for cranial cerebrospinal fluid leak on 9/22/2022. Pt was originally scheduled back in June 2022 for nasal polypectomy and brain MRI revealed a leakage. She c/o chronic sinusitis, occasional "brain fog," no abnormal gait, no weakness, no headaches at present. Her medical history significant for obesity s/p bariatric surgery- gastric sleeve 6/3/2021, heartburn, previous covid-19 (2/2021, symptoms included fever, SOB, headaches, no hospitalization, no intubation). rhinoplasty 8 yrs ago  She denies any symptoms of covid-19, and scheduled for PCR test on 9/19/2022 (15 Sep 2022 16:32)     9/22 s/p large skullbase defect, encephalocele w LD repaired w nasoseptal flap    24 HOUR EVENTS:       REVIEW OF SYSTEMS: [] Unable to Assess due to neurologic exam   [ x] All ROS addressed below are non-contributory, except:  Neuro: [ x] Headache [ ] Back pain [ ] Numbness [ x] Weakness [ ] Ataxia [ ] Dizziness [ ] Aphasia [ ] Dysarthria [ ] Visual disturbance  Resp: [ ] Shortness of breath/dyspnea [ ] Orthopnea [ ] Cough  CV: [ ] Chest pain [ ] Palpitation [ ] Lightheadedness [ ] Syncope  Renal: [ ] Thirst [ ] Edema  GI: [ ] Nausea [ ] Emesis [ ] Abdominal pain [ ] Constipation [ ] Diarrhea  Hem: [ ] Hematemesis [ ] bBright red blood per rectum  ID: [ ] Fever [ ] Chills [ ] Dysuria  ENT: [ ] Rhinorrhea    ALLERGIES: Allergies    No Known Allergies    Intolerances        T(C): 36.8 (09-24-22 @ 03:00), Max: 37.1 (09-23-22 @ 15:15)  HR: 62 (09-24-22 @ 06:00) (60 - 84)  BP: 118/67 (09-24-22 @ 06:00) (83/49 - 134/82)  RR: 13 (09-24-22 @ 06:00) (11 - 24)  SpO2: 100% (09-24-22 @ 06:00) (96% - 100%)  09-23-22 @ 07:01 - 09-24-22 @ 07:00  --------------------------------------------------------  IN: 2305 mL / OUT: 2175 mL / NET: 130 mL    acetaminophen     Tablet .. 1000 milliGRAM(s) Oral every 6 hours PRN  acetaminophen     Tablet .. 325 milliGRAM(s) Oral once  bisacodyl 5 milliGRAM(s) Oral daily PRN  chlorhexidine 4% Liquid 1 Application(s) Topical <User Schedule>  enoxaparin Injectable 40 milliGRAM(s) SubCutaneous <User Schedule>  levETIRAcetam 500 milliGRAM(s) Oral two times a day  metoclopramide Injectable 10 milliGRAM(s) IV Push every 8 hours PRN  ondansetron Injectable 4 milliGRAM(s) IV Push every 6 hours PRN  polyethylene glycol 3350 17 Gram(s) Oral two times a day  senna 2 Tablet(s) Oral at bedtime  traMADol 25 milliGRAM(s) Oral every 4 hours PRN  traMADol 50 milliGRAM(s) Oral every 4 hours PRN      DEVICES:   [] Restraints [x] PIVs [] ET tube [] central line [] PICC [x] arterial line [] pinzon [] NGT/OGT [] EVD [] LD [] LOUISA/HMV [] LiCOX [] ICP monitor [] Trach [] PEG [] Chest Tube [] other:    EXAMINATION:  General: No acute distress  HEENT: Anicteric sclerae  Cardiac: [id number]  Lungs: Clear  Abdomen: Soft, non-tender, +BS  Extremities: No c/c/e  Skin/Incision Site: Clean, dry and intact  Neurologic: Awake, alert, fully oriented, follows commands, PERRL, EOMI, face symmetric, no drift, BUE 5/5, LLE 5/5 but has a drift , RLE 5/5, nl sensation         LABS:  Na: 142 (09-24 @ 01:48), 140 (09-23 @ 08:16), 141 (09-22 @ 22:51)  K: 4.1 (09-24 @ 01:48), 3.8 (09-23 @ 08:16), 4.0 (09-22 @ 22:51)  Cl: 109 (09-24 @ 01:48), 106 (09-23 @ 08:16), 106 (09-22 @ 22:51)  CO2: 26 (09-24 @ 01:48), 26 (09-23 @ 08:16), 25 (09-22 @ 22:51)  BUN: 10 (09-24 @ 01:48), 8 (09-23 @ 08:16), 11 (09-22 @ 22:51)  Cr: 0.60 (09-24 @ 01:48), 0.61 (09-23 @ 08:16), 0.59 (09-22 @ 22:51)  Glu: 115(09-24 @ 01:48), 109(09-23 @ 08:16), 146(09-22 @ 22:51)    Hgb: 10.2 (09-24 @ 01:48), 9.8 (09-23 @ 08:16), 11.3 (09-22 @ 22:51)  Hct: 32.7 (09-24 @ 01:48), 31.0 (09-23 @ 08:16), 35.9 (09-22 @ 22:51)  WBC: 6.89 (09-24 @ 01:48), 8.84 (09-23 @ 08:16), 9.34 (09-22 @ 22:51)  Plt: 196 (09-24 @ 01:48), 197 (09-23 @ 08:16), 245 (09-22 @ 22:51)    INR:   PTT:             ASSESSMENT:   51 year old female presents for preop testing for endonasal and transcranial repair of CSF leak with abdominal fat graft/ lumbar drain for cranial cerebrospinal fluid leak on 9/22/2022. Pt was originally scheduled back in June 2022 for nasal polypectomy and brain MRI revealed a leakage. She c/o chronic sinusitis, occasional "brain fog," no abnormal gait, no weakness, no headaches at present. Her medical history significant for obesity s/p bariatric surgery- gastric sleeve 6/3/2021, heartburn, previous covid-19 (2/2021, symptoms included fever, SOB, headaches, no hospitalization, no intubation). rhinoplasty 8 yrs ago  She denies any symptoms of covid-19, and scheduled for PCR test on 9/19/2022 (15 Sep 2022 16:32)      POD1 large skullbase defect, encephalocele w LD repaired w nasoseptal flap    NEURO:  [id number]  LD @5cc/hr per neurosurgery x3d  tramadol prn for pain control, tylenol IV  MRI spine showed no paraspinal fluid  keppra 500 bid sz ppx x7d  HOB 30  Activity: [x] OOB as tolerated [] Bedrest [x] PT [x] OT [] PMNR    PULM: RA, no IS  mobilize as tolerated    CV:  Keep -150mmHg, d/c a-line     RENAL:  IVL    GI:  Diet: reg diet  GI prophylaxis [x] not indicated [] PPI [] other:  Bowel regimen standing     ENDO:   Goal euglycemia (-180)    HEME/ONC: LED check  VTE prophylaxis: [x] SCDs  hold lovenox ppx fresh post op    ID:  Marly-op antibiotics, nafcillin    MISC:    SOCIAL/FAMILY:  [x] awaiting [] updated at bedside [] family meeting    CODE STATUS:  [x] Full Code [] DNR [] DNI [] Palliative/Comfort Care    DISPOSITION:  [x] ICU [] Stroke Unit [] Floor [] EMU [] RCU [] PCU    [x] Patient is at high risk of neurologic deterioration/death due to: meningitis, overdrainage brain sag and SDH      Contact: [phone]      51 year old female presents for preop testing for endonasal and transcranial repair of CSF leak with abdominal fat graft/ lumbar drain for cranial cerebrospinal fluid leak on 2022. Pt was originally scheduled back in 2022 for nasal polypectomy and brain MRI revealed a leakage. She c/o chronic sinusitis, occasional "brain fog," no abnormal gait, no weakness, no headaches at present. Her medical history significant for obesity s/p bariatric surgery- gastric sleeve 6/3/2021, heartburn, previous covid-19 (2021, symptoms included fever, SOB, headaches, no hospitalization, no intubation). 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    PAST MEDICAL & SURGICAL HISTORY:  Fibroid      History of migraine headaches      Chronic ethmoidal sinusitis      History of constipation      2019 novel coronavirus disease (COVID-19)  2021  fever, SOB, headaches, vomiting and diarrhea      History of heartburn      Other cranial cerebrospinal fluid leak      H/O nasal polyp       delivery delivered  x4      History of colonoscopy  x7 years ago  and upper endoscopy      H/O bariatric surgery  gastric sleeve   6/3/2021  prior to surgery weight 230 pounds      History of rhinoplasty  x8 years      H/O arthroscopy of knee  left knee      H/O tubal ligation  x6 years ago          24 HOUR EVENTS:       REVIEW OF SYSTEMS: [] Unable to Assess due to neurologic exam   [ x] All ROS addressed below are non-contributory, except:  Neuro: [ x] Headache [ ] Back pain [ ] Numbness [ x] Weakness [ ] Ataxia [ ] Dizziness [ ] Aphasia [ ] Dysarthria [ ] Visual disturbance  Resp: [ ] Shortness of breath/dyspnea [ ] Orthopnea [ ] Cough  Allergies    No Known Allergies    Intolerances    CV: [ ] Chest pain [ ] Palpitation [ ] Lightheadedness [ ] Syncope  Renal: [ ] Thirst [ ] Edema  GI: [ ] Nausea [ ] Emesis [ ] Abdominal pain [ ] Constipation [ ] Diarrhea  Hem: [ ] Hematemesis [ ] bBright red blood per rectum  ID: [ ] Fever [ ] Chills [ ] Dysuria  ENT: [ ] Rhinorrhea    ALLERGIES: Allergies    No Known Allergies    Intolerances        T(C): 36.8 (22 @ 03:00), Max: 37.1 (22 @ 15:15)  HR: 62 (22 @ 06:00) (60 - 84)  BP: 118/67 (22 @ 06:00) (83/49 - 134/82)  RR: 13 (22 @ 06:00) (11 - 24)  SpO2: 100% (22 @ 06:00) (96% - 100%)  22 @ 07:01  -  22 @ 07:00  --------------------------------------------------------  IN: 2305 mL / OUT: 2175 mL / NET: 130 mL    acetaminophen     Tablet .. 1000 milliGRAM(s) Oral every 6 hours PRN  acetaminophen     Tablet .. 325 milliGRAM(s) Oral once  bisacodyl 5 milliGRAM(s) Oral daily PRN  chlorhexidine 4% Liquid 1 Application(s) Topical <User Schedule>  enoxaparin Injectable 40 milliGRAM(s) SubCutaneous <User Schedule>  levETIRAcetam 500 milliGRAM(s) Oral two times a day  metoclopramide Injectable 10 milliGRAM(s) IV Push every 8 hours PRN  ondansetron Injectable 4 milliGRAM(s) IV Push every 6 hours PRN  polyethylene glycol 3350 17 Gram(s) Oral two times a day  senna 2 Tablet(s) Oral at bedtime  traMADol 25 milliGRAM(s) Oral every 4 hours PRN  traMADol 50 milliGRAM(s) Oral every 4 hours PRN      DEVICES:   [] Restraints [x] PIVs [] ET tube [] central line [] PICC [x] arterial line [] pinzon [] NGT/OGT [] EVD [] LD [] LOUISA/HMV [] LiCOX [] ICP monitor [] Trach [] PEG [] Chest Tube [] other:    EXAMINATION:  General: No acute distress  HEENT: Anicteric sclerae  Cardiac: [id number]  Lungs: Clear  Abdomen: Soft, non-tender, +BS  Extremities: No c/c/e  Skin/Incision Site: Clean, dry and intact  Neurologic: Awake, alert, fully oriented, follows commands, PERRL, EOMI, face symmetric, no drift, BUE 5/5, LLE 5/5 but has a drift , RLE 5/5, nl sensation         LABS:  Na: 142 ( @ 01:48), 140 ( @ 08:16), 141 ( @ 22:51)  K: 4.1 (:48), 3.8 ( @ 08:16), 4.0 ( @ 22:51)  Cl: 109 (:48), 106 ( @ 08:16), 106 (:51)  CO2: 26 (:48), 26 ( @ 08:16), 25 ( @ :51)  BUN: 10 (:48), 8 ( @ 08:16), 11 ( 22:51)  Cr: 0.60 ( @ :48), 0.61 ( @ 08:16), 0.59 ( @ 22:51)  Glu: 115(:48), 109( @ 08:16), 146( @ 22:51)    Hgb: 10.2 (:48), 9.8 ( @ 08:16), 11.3 (:51)  Hct: 32.7 (:48), 31.0 ( @ 08:16), 35.9 ( @ 22:51)  WBC: 6.89 (:48), 8.84 ( @ 08:16), 9.34 ( @ 22:51)  Plt: 196 ( @ :48), 197 ( @ 08:16), 245 ( @ 22:51)    INR:   PTT:             ASSESSMENT:   51 year old female  POD2 large skullbase defect, encephalocele w LD repaired w nasoseptal flap    NEURO:  nchecks q 4 hr  CSF leak LD 5 cc/hr for 2 more days   remove Merocel packing on .   monitor for CSF leak   keppra 500 bid sz ppx per NS   Activity: [x] OOB as tolerated [] Bedrest [x] PT [x] OT [] PMNR    PULM: RA  pituitary precaution     CV:  Keep -160mmHg    RENAL:  IVL  send urine SG because of increased UO     GI:  Diet: reg diet  GI prophylaxis [x] not indicated [] PPI [] other:  no BM since admission on Miralax     ENDO:   Goal euglycemia (-180)    HEME/ONC: LED check  VTE prophylaxis: lovenox 40 mg sc qhs     ID:  continue ancef       SOCIAL/FAMILY:  [x] awaiting [] updated at bedside [] family meeting    CODE STATUS:  [x] Full Code [] DNR [] DNI [] Palliative/Comfort Care    not critical

## 2022-09-24 NOTE — PROGRESS NOTE ADULT - ASSESSMENT
52 YO with PMH/PSH of obesity s/p bariatric surgery- gastric sleeve 6/3/2021, GERD,  rhinoplasty 8 yrs ago presents with CSF Rhinorrhea . Now S/P Endonasal CSF Leak Repair POD #2. Repaired with Right middle Turb flap and Nasopore/ Merocel. LD @ 5cc/hr. Pt was evaluated at bedside. C/o frontal HA, 7/10 pain, relieved with pain meds. No CSF Leak noted.

## 2022-09-24 NOTE — PROGRESS NOTE ADULT - SUBJECTIVE AND OBJECTIVE BOX
Patient seen and examined at bedside.    --Anticoagulation--    T(C): 36.6 (09-23-22 @ 19:00), Max: 37.1 (09-23-22 @ 15:15)  HR: 84 (09-23-22 @ 21:00) (59 - 84)  BP: 107/59 (09-23-22 @ 21:00) (83/49 - 118/67)  RR: 21 (09-23-22 @ 21:00) (10 - 24)  SpO2: 100% (09-23-22 @ 21:00) (96% - 100%)  Wt(kg): --    Exam:  AOX3, PERLL,  LLE slightly AG with assistance, RLE 4/5

## 2022-09-24 NOTE — PROGRESS NOTE ADULT - SUBJECTIVE AND OBJECTIVE BOX
51 year old female presents for preop testing for endonasal and transcranial repair of CSF leak with abdominal fat graft/ lumbar drain for cranial cerebrospinal fluid leak on 2022. Pt was originally scheduled back in 2022 for nasal polypectomy and brain MRI revealed a leakage. She c/o chronic sinusitis, occasional "brain fog," no abnormal gait, no weakness, no headaches at present. Her medical history significant for obesity s/p bariatric surgery- gastric sleeve 6/3/2021, heartburn, previous covid-19 (2021, symptoms included fever, SOB, headaches, no hospitalization, no intubation). 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    PAST MEDICAL & SURGICAL HISTORY:  Fibroid      History of migraine headaches      Chronic ethmoidal sinusitis      History of constipation      2019 novel coronavirus disease (COVID-19)  2021  fever, SOB, headaches, vomiting and diarrhea      History of heartburn      Other cranial cerebrospinal fluid leak      H/O nasal polyp       delivery delivered  x4      History of colonoscopy  x7 years ago  and upper endoscopy      H/O bariatric surgery  gastric sleeve   6/3/2021  prior to surgery weight 230 pounds      History of rhinoplasty  x8 years      H/O arthroscopy of knee  left knee      H/O tubal ligation  x6 years ago          24 HOUR EVENTS:       REVIEW OF SYSTEMS: [] Unable to Assess due to neurologic exam   [ x] All ROS addressed below are non-contributory, except:  Neuro: [ x] Headache [ ] Back pain [ ] Numbness [ x] Weakness [ ] Ataxia [ ] Dizziness [ ] Aphasia [ ] Dysarthria [ ] Visual disturbance  Resp: [ ] Shortness of breath/dyspnea [ ] Orthopnea [ ] Cough  Allergies    No Known Allergies    Intolerances    CV: [ ] Chest pain [ ] Palpitation [ ] Lightheadedness [ ] Syncope  Renal: [ ] Thirst [ ] Edema  GI: [ ] Nausea [ ] Emesis [ ] Abdominal pain [ ] Constipation [ ] Diarrhea  Hem: [ ] Hematemesis [ ] bBright red blood per rectum  ID: [ ] Fever [ ] Chills [ ] Dysuria  ENT: [ ] Rhinorrhea    ALLERGIES: Allergies    No Known Allergies    Intolerances      ICU Vital Signs Last 24 Hrs  T(C): 36.9 (24 Sep 2022 15:00), Max: 36.9 (24 Sep 2022 15:00)  T(F): 98.4 (24 Sep 2022 15:00), Max: 98.4 (24 Sep 2022 15:00)  HR: 60 (24 Sep 2022 18:00) (53 - 84)  BP: 111/61 (24 Sep 2022 18:00) (104/54 - 152/76)  BP(mean): 72 (24 Sep 2022 18:00) (64 - 95)  ABP: --  ABP(mean): --  RR: 14 (24 Sep 2022 18:00) (11 - 21)  SpO2: 100% (24 Sep 2022 18:00) (98% - 100%)    O2 Parameters below as of 24 Sep 2022 03:00  Patient On (Oxygen Delivery Method): room air            acetaminophen     Tablet .. 1000 milliGRAM(s) Oral every 6 hours PRN  acetaminophen     Tablet .. 325 milliGRAM(s) Oral once  bisacodyl 5 milliGRAM(s) Oral daily PRN  chlorhexidine 4% Liquid 1 Application(s) Topical <User Schedule>  enoxaparin Injectable 40 milliGRAM(s) SubCutaneous <User Schedule>  levETIRAcetam 500 milliGRAM(s) Oral two times a day  metoclopramide Injectable 10 milliGRAM(s) IV Push every 8 hours PRN  ondansetron Injectable 4 milliGRAM(s) IV Push every 6 hours PRN  polyethylene glycol 3350 17 Gram(s) Oral two times a day  senna 2 Tablet(s) Oral at bedtime  traMADol 25 milliGRAM(s) Oral every 4 hours PRN  traMADol 50 milliGRAM(s) Oral every 4 hours PRN      DEVICES:   [] Restraints [x] PIVs [] ET tube [] central line [] PICC [x] arterial line [] pinzon [] NGT/OGT [] EVD [] LD [] LOUISA/HMV [] LiCOX [] ICP monitor [] Trach [] PEG [] Chest Tube [] other:    EXAMINATION:  General: No acute distress  HEENT: Anicteric sclerae  Cardiac: [id number]  Lungs: Clear  Abdomen: Soft, non-tender, +BS  Extremities: No c/c/e  Skin/Incision Site: Clean, dry and intact  Neurologic: Awake, alert, fully oriented, follows commands, PERRL, EOMI, face symmetric, no drift, BUE 5/5, LLE 5/5 but has a drift , RLE 5/5, nl sensation         LABS:  .  LABS:                         10.2   6.89  )-----------( 196      ( 24 Sep 2022 01:48 )             32.7     09-    142  |  109<H>  |  10  ----------------------------<  115<H>  4.1   |  26  |  0.60    Ca    8.4      24 Sep 2022 01:48  Phos  2.6     -  Mg     2.2           RADIOLOGY, EKG & ADDITIONAL TESTS: Reviewed.             ASSESSMENT:   51 year old female  POD2 large skullbase defect, encephalocele w LD repaired w nasoseptal flap    NEURO:  nchecks q 4 hr  CSF leak LD 5 cc/hr for 2 more days   remove Merocel packing on .   monitor for CSF leak   keppra 500 bid sz ppx per NS   Activity: [x] OOB as tolerated [] Bedrest [x] PT [x] OT [] PMNR    PULM: RA  pituitary precaution     CV:  Keep -160mmHg    RENAL:  IVL  send urine SG because of increased UO     GI:  Diet: reg diet  GI prophylaxis [x] not indicated [] PPI [] other:  no BM since admission on Miralax     ENDO:   Goal euglycemia (-180)    HEME/ONC: LED check  VTE prophylaxis: lovenox 40 mg sc qhs     ID:  continue ancef       SOCIAL/FAMILY:  [x] awaiting [] updated at bedside [] family meeting    CODE STATUS:  [x] Full Code [] DNR [] DNI [] Palliative/Comfort Care    not critical      51 year old female presents for preop testing for endonasal and transcranial repair of CSF leak with abdominal fat graft/ lumbar drain for cranial cerebrospinal fluid leak on 2022. Pt was originally scheduled back in 2022 for nasal polypectomy and brain MRI revealed a leakage. She c/o chronic sinusitis, occasional "brain fog," no abnormal gait, no weakness, no headaches at present. Her medical history significant for obesity s/p bariatric surgery- gastric sleeve 6/3/2021, heartburn, previous covid-19 (2021, symptoms included fever, SOB, headaches, no hospitalization, no intubation). 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    PAST MEDICAL & SURGICAL HISTORY:  Fibroid    History of migraine headaches  Chronic ethmoidal sinusitis  History of constipation  2019 novel coronavirus disease (COVID-19)  2021  fever, SOB, headaches, vomiting and diarrhea  History of heartburn  Other cranial cerebrospinal fluid leak  H/O nasal polyp   delivery delivered  x4  History of colonoscopy  x7 years ago  and upper endoscopy  H/O bariatric surgery  gastric sleeve   6/3/2021  prior to surgery weight 230 pounds  History of rhinoplasty  x8 years  H/O arthroscopy of knee  left knee  H/O tubal ligation  x6 years ago      24 HOUR EVENTS: leaking from dressing, put staples tonight, feeling sad      REVIEW OF SYSTEMS: [] Unable to Assess due to neurologic exam   [ x] All ROS addressed below are non-contributory, except:  Neuro: [ x] Headache [ ] Back pain [ ] Numbness [ x] Weakness [ ] Ataxia [ ] Dizziness [ ] Aphasia [ ] Dysarthria [ ] Visual disturbance  Resp: [ ] Shortness of breath/dyspnea [ ] Orthopnea [ ] Cough  Allergies    No Known Allergies    Intolerances    CV: [ ] Chest pain [ ] Palpitation [ ] Lightheadedness [ ] Syncope  Renal: [ ] Thirst [ ] Edema  GI: [ ] Nausea [ ] Emesis [ ] Abdominal pain [ ] Constipation [ ] Diarrhea  Hem: [ ] Hematemesis [ ] bBright red blood per rectum  ID: [ ] Fever [ ] Chills [ ] Dysuria  ENT: [ ] Rhinorrhea    ALLERGIES: Allergies    No Known Allergies    Intolerances    DEVICES:   [] Restraints [x] PIVs [] ET tube [] central line [] PICC [] arterial line [] pinzon [] NGT/OGT [] EVD [] LD [] LOUISA/HMV [] LiCOX [] ICP monitor [] Trach [] PEG [] Chest Tube [] other:    EXAMINATION:  General: No acute distress  HEENT: Anicteric sclerae  Cardiac: [id number]  Lungs: Clear  Abdomen: Soft, non-tender, +BS  Extremities: No c/c/e  Skin/Incision Site: Clean, dry and intact  Neurologic: Awake, alert, fully oriented, follows commands, PERRL, EOMI, face symmetric, no drift, BUE 5/5, LLE 5/5 but has a drift , RLE 5/5, nl sensation         ASSESSMENT:   51 year old female  POD2 large skullbase defect, encephalocele w LD repaired w nasoseptal flap    NEURO:  nchecks q 4 hr  CSF leak LD 5 cc/hr for 2 more days   remove Merocel packing on .   will try reglan/decadron once for headaches  monitor for CSF leak   keppra 500 bid sz ppx per NS   Activity: [x] OOB as tolerated [] Bedrest [x] PT [x] OT [] PMNR    PULM: RA  pituitary precaution     CV:  Keep -160mmHg    RENAL:  IVL  send urine SG because of increased UO     GI:  Diet: reg diet  GI prophylaxis [x] not indicated [] PPI [] other:  no BM since admission on Miralax     ENDO:   Goal euglycemia (-180)    HEME/ONC: LED check  VTE prophylaxis: lovenox 40 mg sc qhs     ID:  continue ancef       SOCIAL/FAMILY:  [x] awaiting [] updated at bedside [] family meeting    CODE STATUS:  [x] Full Code [] DNR [] DNI [] Palliative/Comfort Care    not critical      51 year old female presents for preop testing for endonasal and transcranial repair of CSF leak with abdominal fat graft/ lumbar drain for cranial cerebrospinal fluid leak on 2022. Pt was originally scheduled back in 2022 for nasal polypectomy and brain MRI revealed a leakage. She c/o chronic sinusitis, occasional "brain fog," no abnormal gait, no weakness, no headaches at present. Her medical history significant for obesity s/p bariatric surgery- gastric sleeve 6/3/2021, heartburn, previous covid-19 (2021, symptoms included fever, SOB, headaches, no hospitalization, no intubation). 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    PAST MEDICAL & SURGICAL HISTORY:  Fibroid    History of migraine headaches  Chronic ethmoidal sinusitis  History of constipation  2019 novel coronavirus disease (COVID-19)  2021  fever, SOB, headaches, vomiting and diarrhea  History of heartburn  Other cranial cerebrospinal fluid leak  H/O nasal polyp   delivery delivered  x4  History of colonoscopy  x7 years ago  and upper endoscopy  H/O bariatric surgery  gastric sleeve   6/3/2021  prior to surgery weight 230 pounds  History of rhinoplasty  x8 years  H/O arthroscopy of knee  left knee  H/O tubal ligation  x6 years ago      24 HOUR EVENTS: headache, refusing opioids      REVIEW OF SYSTEMS: [] Unable to Assess due to neurologic exam   [ x] All ROS addressed below are non-contributory, except:  Neuro: [ x] Headache [ ] Back pain [ ] Numbness [ x] Weakness [ ] Ataxia [ ] Dizziness [ ] Aphasia [ ] Dysarthria [ ] Visual disturbance  Resp: [ ] Shortness of breath/dyspnea [ ] Orthopnea [ ] Cough  Allergies    No Known Allergies    Intolerances    CV: [ ] Chest pain [ ] Palpitation [ ] Lightheadedness [ ] Syncope  Renal: [ ] Thirst [ ] Edema  GI: [ ] Nausea [ ] Emesis [ ] Abdominal pain [ ] Constipation [ ] Diarrhea  Hem: [ ] Hematemesis [ ] bBright red blood per rectum  ID: [ ] Fever [ ] Chills [ ] Dysuria  ENT: [ ] Rhinorrhea    ALLERGIES: Allergies    No Known Allergies    Intolerances    DEVICES:   [] Restraints [x] PIVs [] ET tube [] central line [] PICC [] arterial line [] pinzon [] NGT/OGT [] EVD [] LD [] LOUISA/HMV [] LiCOX [] ICP monitor [] Trach [] PEG [] Chest Tube [] other:    EXAMINATION:  General: No acute distress  HEENT: Anicteric sclerae  Cardiac: [id number]  Lungs: Clear  Abdomen: Soft, non-tender, +BS  Extremities: No c/c/e  Skin/Incision Site: Clean, dry and intact  Neurologic: Awake, alert, fully oriented, follows commands, PERRL, EOMI, face symmetric, no drift, BUE 5/5, LLE 5/5 but has a drift , RLE 5/5, nl sensation         ASSESSMENT:   51 year old female  POD2 large skullbase defect, encephalocele w LD repaired w nasoseptal flap    NEURO:  nchecks q 4 hr  CSF leak LD 5 cc/hr for 1 more days   remove Merocel packing on .   will try reglan/decadron once for headaches  monitor for CSF leak   keppra 500 bid sz ppx per NS   Activity: [x] OOB as tolerated [] Bedrest [x] PT [x] OT [] PMNR    PULM: RA  pituitary precaution     CV:  Keep -160mmHg    RENAL:  IVL  send urine SG because of increased UO     GI:  Diet: reg diet  GI prophylaxis [x] not indicated [] PPI [] other:  no BM since admission on Miralax     ENDO:   Goal euglycemia (-180)    HEME/ONC: LED check  VTE prophylaxis: lovenox 40 mg sc qhs     ID:  continue ancef       SOCIAL/FAMILY:  [x] awaiting [] updated at bedside [] family meeting    CODE STATUS:  [x] Full Code [] DNR [] DNI [] Palliative/Comfort Care    not critical

## 2022-09-24 NOTE — PROGRESS NOTE ADULT - PROBLEM SELECTOR PLAN 1
- Monitor for CSF leak.   - Repaired with R. Middle Turb flap/Nasopore/Merocel.   - Will remove Merocel packing on 9/27.   - c/w Nafcillin.   - TSRP Precautions (no incentive spirometry, no straws, no BIPAP)  - Humidified O2 prn.   - Pain meds prn.   - ENT will follow.   - Call with questions.

## 2022-09-25 PROCEDURE — 93970 EXTREMITY STUDY: CPT | Mod: 26

## 2022-09-25 PROCEDURE — 99232 SBSQ HOSP IP/OBS MODERATE 35: CPT | Mod: 25

## 2022-09-25 PROCEDURE — 99233 SBSQ HOSP IP/OBS HIGH 50: CPT

## 2022-09-25 RX ORDER — ACETAMINOPHEN 500 MG
1000 TABLET ORAL ONCE
Refills: 0 | Status: COMPLETED | OUTPATIENT
Start: 2022-09-25 | End: 2022-09-25

## 2022-09-25 RX ADMIN — Medication 1000 MILLIGRAM(S): at 01:51

## 2022-09-25 RX ADMIN — POLYETHYLENE GLYCOL 3350 17 GRAM(S): 17 POWDER, FOR SOLUTION ORAL at 05:39

## 2022-09-25 RX ADMIN — Medication 1000 MILLIGRAM(S): at 22:30

## 2022-09-25 RX ADMIN — Medication 1000 MILLIGRAM(S): at 01:21

## 2022-09-25 RX ADMIN — Medication 1000 MILLIGRAM(S): at 16:12

## 2022-09-25 RX ADMIN — Medication 100 MILLIGRAM(S): at 21:01

## 2022-09-25 RX ADMIN — Medication 5 MILLIGRAM(S): at 11:09

## 2022-09-25 RX ADMIN — Medication 100 MILLIGRAM(S): at 14:12

## 2022-09-25 RX ADMIN — LEVETIRACETAM 500 MILLIGRAM(S): 250 TABLET, FILM COATED ORAL at 05:38

## 2022-09-25 RX ADMIN — Medication 1000 MILLIGRAM(S): at 17:00

## 2022-09-25 RX ADMIN — Medication 5 MILLIGRAM(S): at 05:35

## 2022-09-25 RX ADMIN — CHLORHEXIDINE GLUCONATE 1 APPLICATION(S): 213 SOLUTION TOPICAL at 21:01

## 2022-09-25 RX ADMIN — Medication 1000 MILLIGRAM(S): at 10:00

## 2022-09-25 RX ADMIN — Medication 4 MILLIGRAM(S): at 05:35

## 2022-09-25 RX ADMIN — Medication 400 MILLIGRAM(S): at 22:00

## 2022-09-25 RX ADMIN — Medication 1000 MILLIGRAM(S): at 09:11

## 2022-09-25 RX ADMIN — ENOXAPARIN SODIUM 40 MILLIGRAM(S): 100 INJECTION SUBCUTANEOUS at 17:29

## 2022-09-25 RX ADMIN — POLYETHYLENE GLYCOL 3350 17 GRAM(S): 17 POWDER, FOR SOLUTION ORAL at 17:29

## 2022-09-25 RX ADMIN — Medication 100 MILLIGRAM(S): at 05:41

## 2022-09-25 RX ADMIN — LEVETIRACETAM 500 MILLIGRAM(S): 250 TABLET, FILM COATED ORAL at 17:29

## 2022-09-25 NOTE — PROGRESS NOTE ADULT - TIME BILLING
see note
51 year old female  POD3 large skullbase defect, encephalocele  repaired w nasoseptal flap, CSF leak, has a LD draining 5 cc/hr, no signs of CSF leak, clamp LD tonight, if no CSF, might remove it tomorrow

## 2022-09-25 NOTE — PROGRESS NOTE ADULT - SUBJECTIVE AND OBJECTIVE BOX
[facility] ATTENDING -- ADDITIONAL PROGRESS NOTE    Nighttime rounds were performed -- please refer to earlier Progress Note for HPI details.    T(C): 37.2 (09-25-22 @ 19:00), Max: 37.5 (09-25-22 @ 16:00)  HR: 73 (09-25-22 @ 21:00) (56 - 89)  BP: 140/68 (09-25-22 @ 21:00) (112/68 - 162/87)  RR: 19 (09-25-22 @ 21:00) (12 - 20)  SpO2: 99% (09-25-22 @ 21:00) (97% - 100%)  Wt(kg): --    Relevant labwork and imaging reviewed.    can ambulate with assist to use bathroom  LD clamped per neurosurgery  Q4hr neuro/vitals  protected sleep time 11-5

## 2022-09-25 NOTE — PROGRESS NOTE ADULT - ASSESSMENT
50 YO with PMH/PSH of obesity s/p bariatric surgery- gastric sleeve 6/3/2021, GERD,  rhinoplasty 8 yrs ago presents with CSF Rhinorrhea . Now S/P Endonasal CSF Leak Repair POD #3. Repaired with Right middle Turb flap and Nasopore/ Merocel. LD @ 5cc/hr. Pt was evaluated at bedside. C/o frontal HA, 7/10 pain, relieved with pain meds. No CSF Leak noted.

## 2022-09-25 NOTE — PROGRESS NOTE ADULT - PROBLEM SELECTOR PLAN 1
- Monitor for CSF leak.   - Repaired with R. Middle Turb flap/Nasopore/Merocel.   - Will remove Merocel packing on 9/27.   - c/w ancef  - TSRP Precautions (no incentive spirometry, no straws, no BIPAP)  - Humidified O2 prn.   - Pain meds prn.   - ENT will follow.   - Call with questions.

## 2022-09-25 NOTE — PROGRESS NOTE ADULT - SUBJECTIVE AND OBJECTIVE BOX
Called and left message for pt to let him know his podiatry referral is ready for  at the .   ENT ISSUE/POD: S/P Endonasal CSF Leak Repair POD #3    HPI: 52 YO with PMH/PSH of obesity s/p bariatric surgery- gastric sleeve 6/3/2021, GERD, rhinoplasty 8 yrs ago presents with CSF Rhinorrhea. Now S/P Endonasal CSF Leak Repair POD #3. Repaired with Right middle Turb flap and Nasopore/Merocel. LD @ 5cc/hr. Pt was evaluated at bedside. C/o frontal HA, 7/10 pain, relieved with pain meds.  Denies salty/Metallic taste in mouth, fever/chills, clear rhinorrhea, cough, N/V.       PAST MEDICAL & SURGICAL HISTORY:  Fibroid      History of migraine headaches      Chronic ethmoidal sinusitis      History of constipation      2019 novel coronavirus disease (COVID-19)  2021  fever, SOB, headaches, vomiting and diarrhea      History of heartburn      Other cranial cerebrospinal fluid leak      H/O nasal polyp       delivery delivered  x4      History of colonoscopy  x7 years ago  and upper endoscopy      H/O bariatric surgery  gastric sleeve   6/3/2021  prior to surgery weight 230 pounds      History of rhinoplasty  x8 years      H/O arthroscopy of knee  left knee      H/O tubal ligation  x6 years ago        Allergies    No Known Allergies    Intolerances      MEDICATIONS  (STANDING):  bisacodyl 5 milliGRAM(s) Oral daily  ceFAZolin   IVPB 1000 milliGRAM(s) IV Intermittent every 8 hours  chlorhexidine 4% Liquid 1 Application(s) Topical <User Schedule>  enoxaparin Injectable 40 milliGRAM(s) SubCutaneous <User Schedule>  levETIRAcetam 500 milliGRAM(s) Oral two times a day  polyethylene glycol 3350 17 Gram(s) Oral two times a day  senna 2 Tablet(s) Oral at bedtime    MEDICATIONS  (PRN):  acetaminophen     Tablet .. 1000 milliGRAM(s) Oral every 6 hours PRN Temp greater or equal to 38C (100.4F), Mild Pain (1 - 3)  metoclopramide Injectable 10 milliGRAM(s) IV Push every 8 hours PRN Nausea and/or Vomiting, second line  sodium chloride 0.65% Nasal 1 Spray(s) Both Nostrils three times a day PRN Nasal Congestion  traMADol 50 milliGRAM(s) Oral every 4 hours PRN Severe Pain (7 - 10)  traMADol 25 milliGRAM(s) Oral every 4 hours PRN Moderate Pain (4 - 6)      Social History: see consult    Family history: see consult    ROS:   ENT: all negative except as noted in HPI   Pulm: denies SOB, cough, hemoptysis  Neuro: denies numbness/tingling, loss of sensation  Endo: denies heat/cold intolerance, excessive sweating      Vital Signs Last 24 Hrs  T(C): 36.3 (25 Sep 2022 12:00), Max: 37.3 (25 Sep 2022 03:00)  T(F): 97.3 (25 Sep 2022 12:00), Max: 99.1 (25 Sep 2022 03:00)  HR: 83 (25 Sep 2022 15:00) (56 - 89)  BP: 134/71 (25 Sep 2022 15:00) (111/61 - 162/87)  BP(mean): 87 (25 Sep 2022 15:00) (72 - 106)  RR: 19 (25 Sep 2022 15:00) (12 - 20)  SpO2: 100% (25 Sep 2022 15:00) (98% - 100%)    Parameters below as of 25 Sep 2022 08:00  Patient On (Oxygen Delivery Method): room air                              10.2   6.89  )-----------( 196      ( 24 Sep 2022 01:48 )             32.7    09-24    142  |  109<H>  |  10  ----------------------------<  115<H>  4.1   |  26  |  0.60    Ca    8.4      24 Sep 2022 01:48  Phos  2.6     -24  Mg     2.2     -24         PHYSICAL EXAM:  Gen: NAD, On Humidified O2.   Skin: No rashes, bruises, or lesions  Head: Normocephalic, Atraumatic  Face: no edema, erythema, or fluctuance. Parotid glands soft without mass  Eyes: no scleral injection  Nose: R Nasopore/ Merocel in place. Crusted blood blood in Left Nare.  Mustache drsg in place. No CSF leak noted on exam.   Mouth: No Stridor / Drooling / Trismus.  Mucosa moist, tongue/uvula midline, no bleeding in  posterior oropharynx.  Neck: Flat, supple, no lymphadenopathy, trachea midline, no masses  Lymphatic: No lymphadenopathy  Resp: On Humidified O2.   Neuro: facial nerve intact, no facial droop.

## 2022-09-25 NOTE — PROGRESS NOTE ADULT - SUBJECTIVE AND OBJECTIVE BOX
51 year old female presents for preop testing for endonasal and transcranial repair of CSF leak with abdominal fat graft/ lumbar drain for cranial cerebrospinal fluid leak on 2022. Pt was originally scheduled back in 2022 for nasal polypectomy and brain MRI revealed a leakage. She c/o chronic sinusitis, occasional "brain fog," no abnormal gait, no weakness, no headaches at present. Her medical history significant for obesity s/p bariatric surgery- gastric sleeve 6/3/2021, heartburn, previous covid-19 (2021, symptoms included fever, SOB, headaches, no hospitalization, no intubation). 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    PAST MEDICAL & SURGICAL HISTORY:  Fibroid    History of migraine headaches  Chronic ethmoidal sinusitis  History of constipation  2019 novel coronavirus disease (COVID-19)  2021  fever, SOB, headaches, vomiting and diarrhea  History of heartburn  Other cranial cerebrospinal fluid leak  H/O nasal polyp   delivery delivered  x4  History of colonoscopy  x7 years ago  and upper endoscopy  H/O bariatric surgery  gastric sleeve   6/3/2021  prior to surgery weight 230 pounds  History of rhinoplasty  x8 years  H/O arthroscopy of knee  left knee  H/O tubal ligation  x6 years ago      24 HOUR EVENTS: headaches      REVIEW OF SYSTEMS: [] Unable to Assess due to neurologic exam   [ x] All ROS addressed below are non-contributory, except:  Neuro: [ x] Headache [ ] Back pain [ ] Numbness [ x] Weakness [ ] Ataxia [ ] Dizziness [ ] Aphasia [ ] Dysarthria [ ] Visual disturbance  Resp: [ ] Shortness of breath/dyspnea [ ] Orthopnea [ ] Cough  Allergies    No Known Allergies    Intolerances    CV: [ ] Chest pain [ ] Palpitation [ ] Lightheadedness [ ] Syncope  Renal: [ ] Thirst [ ] Edema  GI: [ ] Nausea [ ] Emesis [ ] Abdominal pain [ ] Constipation [ ] Diarrhea  Hem: [ ] Hematemesis [ ] bBright red blood per rectum  ID: [ ] Fever [ ] Chills [ ] Dysuria  ENT: [ ] Rhinorrhea    ALLERGIES: Allergies    No Known Allergies    Intolerances    DEVICES:   [] Restraints [x] PIVs [] ET tube [] central line [] PICC [] arterial line [] pinzon [] NGT/OGT [] EVD [] LD [] LOUISA/HMV [] LiCOX [] ICP monitor [] Trach [] PEG [] Chest Tube [] other:    EXAMINATION:  General: No acute distress  HEENT: Anicteric sclerae  Cardiac: [id number]  Lungs: Clear  Abdomen: Soft, non-tender, +BS  Extremities: No c/c/e  Skin/Incision Site: Clean, dry and intact  Neurologic: Awake, alert, fully oriented, follows commands, PERRL, EOMI, face symmetric, no drift, BUE 5/5, LLE 5/5 but has a drift , RLE 5/5, nl sensation         ASSESSMENT:   51 year old female  POD3 large skullbase defect, encephalocele w LD repaired w nasoseptal flap    NEURO:  nchecks q 4 hr  CSF leak LD 5 cc/hr ends today  remove Merocel packing on .   will try reglan/decadron once for headaches  monitor for CSF leak   keppra 500 bid sz ppx per NS   Activity: [x] OOB as tolerated [] Bedrest [x] PT [x] OT [] PMNR    PULM: RA  pituitary precaution     CV:  Keep -160mmHg    RENAL:  IVL      GI:  Diet: reg diet  GI prophylaxis [x] not indicated [] PPI [] other:  no BM since admission on Miralax     ENDO:   Goal euglycemia (-180)    HEME/ONC: LED check  VTE prophylaxis: lovenox 40 mg sc qhs     ID:  continue ancef       SOCIAL/FAMILY:  [x] awaiting [] updated at bedside [] family meeting    CODE STATUS:  [x] Full Code [] DNR [] DNI [] Palliative/Comfort Care    not critical      51 year old female presents for preop testing for endonasal and transcranial repair of CSF leak with abdominal fat graft/ lumbar drain for cranial cerebrospinal fluid leak on 2022. Pt was originally scheduled back in 2022 for nasal polypectomy and brain MRI revealed a leakage. She c/o chronic sinusitis, occasional "brain fog," no abnormal gait, no weakness, no headaches at present. Her medical history significant for obesity s/p bariatric surgery- gastric sleeve 6/3/2021, heartburn, previous covid-19 (2021, symptoms included fever, SOB, headaches, no hospitalization, no intubation). 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    PAST MEDICAL & SURGICAL HISTORY:  Fibroid    History of migraine headaches  Chronic ethmoidal sinusitis  History of constipation  2019 novel coronavirus disease (COVID-19)  2021  fever, SOB, headaches, vomiting and diarrhea  History of heartburn  Other cranial cerebrospinal fluid leak  H/O nasal polyp   delivery delivered  x4  History of colonoscopy  x7 years ago  and upper endoscopy  H/O bariatric surgery  gastric sleeve   6/3/2021  prior to surgery weight 230 pounds  History of rhinoplasty  x8 years  H/O arthroscopy of knee  left knee  H/O tubal ligation  x6 years ago      24 HOUR EVENTS: headaches      REVIEW OF SYSTEMS: [] Unable to Assess due to neurologic exam   [ x] All ROS addressed below are non-contributory, except:  Neuro: [ x] Headache [ ] Back pain [ ] Numbness [ x] Weakness [ ] Ataxia [ ] Dizziness [ ] Aphasia [ ] Dysarthria [ ] Visual disturbance  Resp: [ ] Shortness of breath/dyspnea [ ] Orthopnea [ ] Cough  Allergies    No Known Allergies    Intolerances    CV: [ ] Chest pain [ ] Palpitation [ ] Lightheadedness [ ] Syncope  Renal: [ ] Thirst [ ] Edema  GI: [ ] Nausea [ ] Emesis [ ] Abdominal pain [ ] Constipation [ ] Diarrhea  Hem: [ ] Hematemesis [ ] bBright red blood per rectum  ID: [ ] Fever [ ] Chills [ ] Dysuria  ENT: [ ] Rhinorrhea    ALLERGIES: Allergies    No Known Allergies    Intolerances    DEVICES:   [] Restraints [x] PIVs [] ET tube [] central line [] PICC [] arterial line [] pinzon [] NGT/OGT [] EVD [] LD [] LOUISA/HMV [] LiCOX [] ICP monitor [] Trach [] PEG [] Chest Tube [] other:    T(C): 36.6 (22 @ 08:00), Max: 37.3 (22 @ 03:00)  HR: 72 (22 @ 10:00) (56 - 84)  BP: 162/74 (22 @ 10:00) (104/54 - 162/87)  RR: 13 (22 @ 10:00) (12 - 17)  SpO2: 100% (22 @ 10:00) (98% - 100%)  22 @ 07:01  -  22 @ 07:00  --------------------------------------------------------  IN: 700 mL / OUT: 1970 mL / NET: -1270 mL    22 @ 07:01  -  22 @ 10:23  --------------------------------------------------------  IN: 0 mL / OUT: 15 mL / NET: -15 mL    acetaminophen     Tablet .. 1000 milliGRAM(s) Oral every 6 hours PRN  bisacodyl 5 milliGRAM(s) Oral daily  ceFAZolin   IVPB 1000 milliGRAM(s) IV Intermittent every 8 hours  chlorhexidine 4% Liquid 1 Application(s) Topical <User Schedule>  enoxaparin Injectable 40 milliGRAM(s) SubCutaneous <User Schedule>  levETIRAcetam 500 milliGRAM(s) Oral two times a day  metoclopramide Injectable 10 milliGRAM(s) IV Push every 8 hours PRN  ondansetron Injectable 4 milliGRAM(s) IV Push every 6 hours PRN  polyethylene glycol 3350 17 Gram(s) Oral two times a day  senna 2 Tablet(s) Oral at bedtime  sodium chloride 0.65% Nasal 1 Spray(s) Both Nostrils three times a day PRN  traMADol 25 milliGRAM(s) Oral every 4 hours PRN  traMADol 50 milliGRAM(s) Oral every 4 hours PRN      EXAMINATION:  General: No acute distress  HEENT: Anicteric sclerae  Cardiac: [id number]  Lungs: Clear  Abdomen: Soft, non-tender, +BS  Extremities: No c/c/e  Skin/Incision Site: Clean, dry and intact  Neurologic: Awake, alert, fully oriented, follows commands, PERRL, EOMI, face symmetric, no drift, BUE 5/5, LLE 5/5 but has a drift , RLE 5/5, nl sensation         LABS:  Na: 142 ( @ :48), 140 ( @ 08:16), 141 ( @ 22:51)  K: 4.1 (:48), 3.8 ( @ 08:16), 4.0 (:51)  Cl: 109 (:48), 106 ( @ 08:16), 106 ( @ 22:51)  CO2: 26 (:48), 26 ( @ 08:16), 25 ( @ 22:51)  BUN: 10 (:48), 8 ( @ 08:16), 11 ( @ :51)  Cr: 0.60 (:48), 0.61 ( @ 08:16), 0.59 ( @ 22:51)  Glu: 115(:48), 109( @ 08:16), 146( 22:51)    Hgb: 10.2 (:48), 9.8 ( @ 08:16), 11.3 ( 22:51)  Hct: 32.7 (:48), 31.0 (09-23 @ 08:16), 35.9 ( @ 22:51)  WBC: 6.89 ( @ 01:48), 8.84 ( @ 08:16), 9.34 ( @ 22:51)  Plt: 196 ( @ 01:48), 197 ( @ 08:16), 245 ( @ 22:51)    INR:   PTT:                   ASSESSMENT:   51 year old female  POD3 large skullbase defect, encephalocele w LD repaired w nasoseptal flap    NEURO:  nchecks q 4 hr  CSF leak LD 5 cc/hr clamp LD tonight   monitor for CSF leak   keppra 500 bid sz ppx per NS   Activity: [x] OOB as tolerated [] Bedrest [x] PT [x] OT [] PMNR    PULM: RA  pituitary precaution     CV:  Keep -160mmHg    RENAL:  IVL      GI:  Diet: reg diet  GI prophylaxis [x] not indicated [] PPI [] other:  miralax and senna     ENDO:   Goal euglycemia (-180)    HEME/ONC: LED check  VTE prophylaxis: lovenox 40 mg sc qhs     ID:  continue ancef due to nasal packing       SOCIAL/FAMILY:  [x] awaiting [] updated at bedside [] family meeting    CODE STATUS:  [x] Full Code [] DNR [] DNI [] Palliative/Comfort Care    not critical

## 2022-09-26 LAB — SURGICAL PATHOLOGY STUDY: SIGNIFICANT CHANGE UP

## 2022-09-26 PROCEDURE — 99233 SBSQ HOSP IP/OBS HIGH 50: CPT

## 2022-09-26 RX ORDER — LIDOCAINE HCL 20 MG/ML
10 VIAL (ML) INJECTION ONCE
Refills: 0 | Status: DISCONTINUED | OUTPATIENT
Start: 2022-09-26 | End: 2022-09-27

## 2022-09-26 RX ADMIN — Medication 1000 MILLIGRAM(S): at 06:07

## 2022-09-26 RX ADMIN — TRAMADOL HYDROCHLORIDE 25 MILLIGRAM(S): 50 TABLET ORAL at 02:24

## 2022-09-26 RX ADMIN — Medication 1000 MILLIGRAM(S): at 05:32

## 2022-09-26 RX ADMIN — LEVETIRACETAM 500 MILLIGRAM(S): 250 TABLET, FILM COATED ORAL at 17:50

## 2022-09-26 RX ADMIN — Medication 1000 MILLIGRAM(S): at 21:45

## 2022-09-26 RX ADMIN — Medication 5 MILLIGRAM(S): at 11:32

## 2022-09-26 RX ADMIN — SENNA PLUS 2 TABLET(S): 8.6 TABLET ORAL at 21:15

## 2022-09-26 RX ADMIN — TRAMADOL HYDROCHLORIDE 50 MILLIGRAM(S): 50 TABLET ORAL at 18:28

## 2022-09-26 RX ADMIN — TRAMADOL HYDROCHLORIDE 50 MILLIGRAM(S): 50 TABLET ORAL at 17:53

## 2022-09-26 RX ADMIN — Medication 100 MILLIGRAM(S): at 14:26

## 2022-09-26 RX ADMIN — Medication 100 MILLIGRAM(S): at 05:32

## 2022-09-26 RX ADMIN — LEVETIRACETAM 500 MILLIGRAM(S): 250 TABLET, FILM COATED ORAL at 05:33

## 2022-09-26 RX ADMIN — TRAMADOL HYDROCHLORIDE 25 MILLIGRAM(S): 50 TABLET ORAL at 03:31

## 2022-09-26 RX ADMIN — Medication 1000 MILLIGRAM(S): at 21:15

## 2022-09-26 NOTE — PROGRESS NOTE ADULT - SUBJECTIVE AND OBJECTIVE BOX
ENT ISSUE/POD:  S/P Endonasal CSF Leak Repair POD #4    HPI: 52 YO with PMH/PSH of obesity s/p bariatric surgery- gastric sleeve 6/3/2021, GERD, rhinoplasty 8 yrs ago presents with CSF Rhinorrhea. Now S/P Endonasal CSF Leak Repair POD #4. Repaired with Right middle Turb flap and Nasopore/Merocel. LD clamped. Pt was evaluated at bedside. C/o frontal HA, relieved with pain meds.  Denies salty/Metallic taste in mouth, fever/chills, clear rhinorrhea, cough, N/V.       PAST MEDICAL & SURGICAL HISTORY:  Fibroid      History of migraine headaches      Chronic ethmoidal sinusitis      History of constipation      2019 novel coronavirus disease (COVID-19)  2021  fever, SOB, headaches, vomiting and diarrhea      History of heartburn      Other cranial cerebrospinal fluid leak      H/O nasal polyp       delivery delivered  x4      History of colonoscopy  x7 years ago  and upper endoscopy      H/O bariatric surgery  gastric sleeve   6/3/2021  prior to surgery weight 230 pounds      History of rhinoplasty  x8 years      H/O arthroscopy of knee  left knee      H/O tubal ligation  x6 years ago        Allergies    No Known Allergies    Intolerances      MEDICATIONS  (STANDING):  bisacodyl 5 milliGRAM(s) Oral daily  ceFAZolin   IVPB 1000 milliGRAM(s) IV Intermittent every 8 hours  chlorhexidine 4% Liquid 1 Application(s) Topical <User Schedule>  enoxaparin Injectable 40 milliGRAM(s) SubCutaneous <User Schedule>  levETIRAcetam 500 milliGRAM(s) Oral two times a day  lidocaine 1% Injectable 10 milliLiter(s) Local Injection once  lidocaine 2% Injectable 10 milliLiter(s) Local Injection once  polyethylene glycol 3350 17 Gram(s) Oral two times a day  senna 2 Tablet(s) Oral at bedtime    MEDICATIONS  (PRN):  acetaminophen     Tablet .. 1000 milliGRAM(s) Oral every 6 hours PRN Temp greater or equal to 38C (100.4F), Mild Pain (1 - 3)  metoclopramide Injectable 10 milliGRAM(s) IV Push every 8 hours PRN Nausea and/or Vomiting, second line  sodium chloride 0.65% Nasal 1 Spray(s) Both Nostrils three times a day PRN Nasal Congestion  traMADol 50 milliGRAM(s) Oral every 4 hours PRN Severe Pain (7 - 10)  traMADol 25 milliGRAM(s) Oral every 4 hours PRN Moderate Pain (4 - 6)      Social History: see consult    Family history: see consult    ROS:   ENT: all negative except as noted in HPI   Pulm: denies SOB, cough, hemoptysis  Neuro: denies numbness/tingling, loss of sensation  Endo: denies heat/cold intolerance, excessive sweating      Vital Signs Last 24 Hrs  T(C): 36.6 (26 Sep 2022 07:00), Max: 37.5 (25 Sep 2022 16:00)  T(F): 97.8 (26 Sep 2022 07:00), Max: 99.5 (25 Sep 2022 16:00)  HR: 58 (26 Sep 2022 07:00) (58 - 89)  BP: 140/70 (26 Sep 2022 07:00) (130/58 - 162/74)  BP(mean): 91 (26 Sep 2022 05:00) (74 - 96)  RR: 14 (26 Sep 2022 07:00) (13 - 20)  SpO2: 99% (26 Sep 2022 07:00) (97% - 100%)    Parameters below as of 26 Sep 2022 07:00  Patient On (Oxygen Delivery Method): room air      PHYSICAL EXAM:  Gen: NAD  Skin: No rashes, bruises, or lesions  Head: Normocephalic, Atraumatic  Face: no edema, erythema, or fluctuance. Parotid glands soft without mass  Eyes: no scleral injection  Nose: R Nasopore/ Merocel in place. Crusted blood blood in Left Nare.  Mustache drsg removed. No CSF leak noted on exam.   Mouth: No Stridor / Drooling / Trismus.  Mucosa moist, tongue/uvula midline, dried blood noted in the posterior oropharynx but no active bleeding  Neck: Flat, supple, no lymphadenopathy, trachea midline, no masses  Lymphatic: No lymphadenopathy  Resp: breathing easily, no stridor  Neuro: facial nerve intact, no facial droop         ENT ISSUE/POD:  S/P Endonasal CSF Leak Repair POD #4    HPI: 52 YO with PMH/PSH of obesity s/p bariatric surgery- gastric sleeve 6/3/2021, GERD, rhinoplasty 8 yrs ago presents with CSF Rhinorrhea. Now S/P Endonasal CSF Leak Repair POD #4. Repaired with Right middle Turb flap and Nasopore/Merocel. LD clamped. Pt was evaluated at bedside. C/o frontal HA, relieved with pain meds.  Denies salty/Metallic taste in mouth, fever/chills, clear rhinorrhea, cough, N/V.       PAST MEDICAL & SURGICAL HISTORY:  Fibroid      History of migraine headaches      Chronic ethmoidal sinusitis      History of constipation      2019 novel coronavirus disease (COVID-19)  2021  fever, SOB, headaches, vomiting and diarrhea      History of heartburn      Other cranial cerebrospinal fluid leak      H/O nasal polyp       delivery delivered  x4      History of colonoscopy  x7 years ago  and upper endoscopy      H/O bariatric surgery  gastric sleeve   6/3/2021  prior to surgery weight 230 pounds      History of rhinoplasty  x8 years      H/O arthroscopy of knee  left knee      H/O tubal ligation  x6 years ago        Allergies    No Known Allergies    Intolerances      MEDICATIONS  (STANDING):  bisacodyl 5 milliGRAM(s) Oral daily  ceFAZolin   IVPB 1000 milliGRAM(s) IV Intermittent every 8 hours  chlorhexidine 4% Liquid 1 Application(s) Topical <User Schedule>  enoxaparin Injectable 40 milliGRAM(s) SubCutaneous <User Schedule>  levETIRAcetam 500 milliGRAM(s) Oral two times a day  lidocaine 1% Injectable 10 milliLiter(s) Local Injection once  lidocaine 2% Injectable 10 milliLiter(s) Local Injection once  polyethylene glycol 3350 17 Gram(s) Oral two times a day  senna 2 Tablet(s) Oral at bedtime    MEDICATIONS  (PRN):  acetaminophen     Tablet .. 1000 milliGRAM(s) Oral every 6 hours PRN Temp greater or equal to 38C (100.4F), Mild Pain (1 - 3)  metoclopramide Injectable 10 milliGRAM(s) IV Push every 8 hours PRN Nausea and/or Vomiting, second line  sodium chloride 0.65% Nasal 1 Spray(s) Both Nostrils three times a day PRN Nasal Congestion  traMADol 50 milliGRAM(s) Oral every 4 hours PRN Severe Pain (7 - 10)  traMADol 25 milliGRAM(s) Oral every 4 hours PRN Moderate Pain (4 - 6)      Social History: see consult    Family history: see consult    ROS:   ENT: all negative except as noted in HPI   Pulm: denies SOB, cough, hemoptysis  Neuro: denies numbness/tingling, loss of sensation  Endo: denies heat/cold intolerance, excessive sweating      Vital Signs Last 24 Hrs  T(C): 36.6 (26 Sep 2022 07:00), Max: 37.5 (25 Sep 2022 16:00)  T(F): 97.8 (26 Sep 2022 07:00), Max: 99.5 (25 Sep 2022 16:00)  HR: 58 (26 Sep 2022 07:00) (58 - 89)  BP: 140/70 (26 Sep 2022 07:00) (130/58 - 162/74)  BP(mean): 91 (26 Sep 2022 05:00) (74 - 96)  RR: 14 (26 Sep 2022 07:00) (13 - 20)  SpO2: 99% (26 Sep 2022 07:00) (97% - 100%)    Parameters below as of 26 Sep 2022 07:00  Patient On (Oxygen Delivery Method): room air      PHYSICAL EXAM:  Gen: NAD  Skin: No rashes, bruises, or lesions  Head: Normocephalic, Atraumatic  Face: no edema, erythema, or fluctuance. Parotid glands soft without mass  Eyes: no scleral injection  Nose: Right Merocel removed. R Nasopore in place. Crusted blood in Left Nare.  Mustache drsg removed. No CSF leak noted on exam.   Mouth: No Stridor / Drooling / Trismus.  Mucosa moist, tongue/uvula midline, dried blood noted in the posterior oropharynx but no active bleeding  Neck: Flat, supple, no lymphadenopathy, trachea midline, no masses  Lymphatic: No lymphadenopathy  Resp: breathing easily, no stridor  Neuro: facial nerve intact, no facial droop

## 2022-09-26 NOTE — OCCUPATIONAL THERAPY INITIAL EVALUATION ADULT - LIVES WITH, PROFILE
Pt lives in a private house w/ 2 adult children, 1 teenager. 1 steps to enter 1 flight inside./children

## 2022-09-26 NOTE — OCCUPATIONAL THERAPY INITIAL EVALUATION ADULT - ADL RETRAINING, OT EVAL
GOAL: Patient will perform ALDs independently within 4 weeks. GOAL: Patient will perform ADLs independently within 4 weeks.

## 2022-09-26 NOTE — PROGRESS NOTE ADULT - ASSESSMENT
51 year old female s/p endonasal and transcranial repair of CSF leak with abdominal fat graft/ lumbar drain for cranial cerebrospinal fluid leak.  -CLAMP LD PM 9/25 if no leak remove tomorrow  -Q4hr neuro/vitals  -protected sleep time 11-5

## 2022-09-26 NOTE — PROGRESS NOTE ADULT - SUBJECTIVE AND OBJECTIVE BOX
Patient seen and examined at bedside.    --Anticoagulation--  enoxaparin Injectable 40 milliGRAM(s) SubCutaneous <User Schedule>    T(C): 37.2 (09-25-22 @ 19:00), Max: 37.5 (09-25-22 @ 16:00)  HR: 68 (09-25-22 @ 22:00) (56 - 89)  BP: 130/62 (09-25-22 @ 22:00) (112/68 - 162/87)  RR: 18 (09-25-22 @ 22:00) (12 - 20)  SpO2: 100% (09-25-22 @ 22:00) (97% - 100%)  Wt(kg): --    Exam:  AOX3, PERLL,  LLE slightly AG with assistance, RLE 4/5

## 2022-09-26 NOTE — OCCUPATIONAL THERAPY INITIAL EVALUATION ADULT - PERTINENT HX OF CURRENT PROBLEM, REHAB EVAL
51 year old female presents for preop testing for endonasal and transcranial repair of CSF leak with abdominal fat graft/ lumbar drain for cranial cerebrospinal fluid leak on 9/22/2022. Pt was originally scheduled back in June 2022 for nasal polypectomy and brain MRI revealed a leakage. She c/o chronic sinusitis, occasional "brain fog," no abnormal gait, no weakness, no headaches at present. Her medical history significant for obesity s/p bariatric surgery- gastric sleeve 6/3/2021, heartburn, previous covid-19 (2/2021, symptoms included fever, SOB, headaches, no hospitalization, no intubation). rhinoplasty 8 yrs ago. 9/22 s/p large skullbase defect, encephalocele w LD repaired w nasoseptal flap. Lumbar drain removed early AM 9/26.

## 2022-09-26 NOTE — OCCUPATIONAL THERAPY INITIAL EVALUATION ADULT - SHORT TERM MEMORY, REHAB EVAL
Elevated blood pressure in the presence of right-sided diastolic congestive heart failure with a history of aortic stenosis  Will try to put the patient on to 40 milligrams of furosemide daily will discontinue her hydrochlorothiazide dose  She will continue on her verapamil 240 milligrams daily  I have asked her to have a metabolic profile along with CBC and an echocardiogram to further evaluate her metabolic status kidney status especially and the status of her aortic stenosis as well as left ventricular function  intact

## 2022-09-26 NOTE — PROGRESS NOTE ADULT - PROBLEM SELECTOR PLAN 1
- Monitor for CSF leak.   - Repaired with R. Middle Turb flap/Nasopore/Merocel.   - Will remove Merocel packing on 9/27.   - c/w ancef  - TSRP Precautions (no incentive spirometry, no straws, no BIPAP)  - Humidified O2 prn.   - Pain meds prn.   - ENT will follow.   - Call with questions. - Monitor for CSF leak.   - Repaired with R. Middle Turb flap/Nasopore/Merocel.   - TSRP Precautions (no incentive spirometry, no straws, no BIPAP)  - Humidified O2 prn.   - Pain meds prn.   - ENT will follow.   - Call with questions.  -F/u outpatient with Dr. Woodward in 2 weeks

## 2022-09-26 NOTE — OCCUPATIONAL THERAPY INITIAL EVALUATION ADULT - GENERAL OBSERVATIONS, REHAB EVAL
Pt received semisupine in bed in [facility], cleared for session per covering RN, +IVL +ICU monitoring

## 2022-09-26 NOTE — PROGRESS NOTE ADULT - ASSESSMENT
52 YO with PMH/PSH of obesity s/p bariatric surgery- gastric sleeve 6/3/2021, GERD,  rhinoplasty 8 yrs ago presents with CSF Rhinorrhea . Now S/P Endonasal CSF Leak Repair POD #4. Repaired with Right middle Turb flap and Nasopore/ Merocel. LD clamped. Pt was evaluated at bedside. C/o frontal HA, relieved with pain meds. No CSF Leak noted.

## 2022-09-26 NOTE — CHART NOTE - NSCHARTNOTEFT_GEN_A_CORE
RILEY [id number]      Drain type: []SD []SG [] LOUISA [] HMV [x] Lumbar drain [] EVD [] ICP [place] [] Abd drain     Patient's position while drain removed: flat    [x] Patient tolerated well [x] No complications [] complications:     Exit Site secured with: [] __ staples [x] __ suture (please specify how many of each)     Additional Info:
CAPRINI SCORE [CLOT]    AGE RELATED RISK FACTORS                                                       MOBILITY RELATED FACTORS  [X] Age 41-60 years                                            (1 Point)                  [ ] Bed rest                                                        (1 Point)  [ ] Age: 61-74 years                                           (2 Points)                 [ ] Plaster cast                                                   (2 Points)  [ ] Age= 75 years                                              (3 Points)                 [ ] Bed bound for more than 72 hours                 (2 Points)    DISEASE RELATED RISK FACTORS                                               GENDER SPECIFIC FACTORS  [ ] Edema in the lower extremities                       (1 Point)                  [ ] Pregnancy                                                     (1 Point)  [ ] Varicose veins                                               (1 Point)                  [ ] Post-partum < 6 weeks                                   (1 Point)             [X ] BMI > 25 Kg/m2                                            (1 Point)                  [ ] Hormonal therapy  or oral contraception          (1 Point)                 [ ] Sepsis (in the previous month)                        (1 Point)                  [ ] History of pregnancy complications                 (1 point)  [ ] Pneumonia or serious lung disease                                               [ ] Unexplained or recurrent                     (1 Point)           (in the previous month)                               (1 Point)  [ ] Abnormal pulmonary function test                     (1 Point)                 SURGERY RELATED RISK FACTORS  [ ] Acute myocardial infarction                              (1 Point)                 [ ]  Section                                             (1 Point)  [ ] Congestive heart failure (in the previous month)  (1 Point)               [ ] Minor surgery                                                  (1 Point)   [ ] Inflammatory bowel disease                             (1 Point)                 [ ] Arthroscopic surgery                                        (2 Points)  [ ] Central venous access                                      (2 Points)                [ X] General surgery lasting more than 45 minutes   (2 Points)       [ ] Stroke (in the previous month)                          (5 Points)               [ ] Elective arthroplasty                                         (5 Points)                                                                                                                                               HEMATOLOGY RELATED FACTORS                                                 TRAUMA RELATED RISK FACTORS  [ ] Prior episodes of VTE                                     (3 Points)                [ ] Fracture of the hip, pelvis, or leg                       (5 Points)  [ ] Positive family history for VTE                         (3 Points)                 [ ] Acute spinal cord injury (in the previous month)  (5 Points)  [ ] Prothrombin 36100 A                                     (3 Points)                 [ ] Paralysis  (less than 1 month)                             (5 Points)  [ ] Factor V Leiden                                             (3 Points)                  [ ] Multiple Trauma within 1 month                        (5 Points)  [ ] Lupus anticoagulants                                     (3 Points)                                                           [ ] Anticardiolipin antibodies                               (3 Points)                                                       [ ] High homocysteine in the blood                      (3 Points)                                             [ ] Other congenital or acquired thrombophilia      (3 Points)                                                [ ] Heparin induced thrombocytopenia                  (3 Points)                                          Total Score [     4     ]    Caprini Score 0 - 2:  Low Risk, No VTE Prophylaxis required for most patients, encourage ambulation  Caprini Score 3 - 6:  At Risk, pharmacologic VTE prophylaxis is indicated for most patients (in the absence of a contraindication) - Pneumatic compresssion Devices [X]   Caprini Score Greater than or = 7:  High Risk, pharmacologic VTE prophylaxis is indicated for most patients (in the absence of a contraindication)

## 2022-09-26 NOTE — PROGRESS NOTE ADULT - SUBJECTIVE AND OBJECTIVE BOX
51 year old female presents for preop testing for endonasal and transcranial repair of CSF leak with abdominal fat graft/ lumbar drain for cranial cerebrospinal fluid leak on 2022. Pt was originally scheduled back in 2022 for nasal polypectomy and brain MRI revealed a leakage. She c/o chronic sinusitis, occasional "brain fog," no abnormal gait, no weakness, no headaches at present. Her medical history significant for obesity s/p bariatric surgery- gastric sleeve 6/3/2021, heartburn, previous covid-19 (2021, symptoms included fever, SOB, headaches, no hospitalization, no intubation). 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    PAST MEDICAL & SURGICAL HISTORY:  Fibroid    History of migraine headaches  Chronic ethmoidal sinusitis  History of constipation  2019 novel coronavirus disease (COVID-19)  2021  fever, SOB, headaches, vomiting and diarrhea  History of heartburn  Other cranial cerebrospinal fluid leak  H/O nasal polyp   delivery delivered  x4  History of colonoscopy  x7 years ago  and upper endoscopy  H/O bariatric surgery  gastric sleeve   6/3/2021  prior to surgery weight 230 pounds  History of rhinoplasty  x8 years  H/O arthroscopy of knee  left knee  H/O tubal ligation  x6 years ago      24 HOUR EVENTS: headaches      REVIEW OF SYSTEMS: [] Unable to Assess due to neurologic exam   [ x] All ROS addressed below are non-contributory, except:  Neuro: [ x] Headache [ ] Back pain [ ] Numbness [ x] Weakness [ ] Ataxia [ ] Dizziness [ ] Aphasia [ ] Dysarthria [ ] Visual disturbance  Resp: [ ] Shortness of breath/dyspnea [ ] Orthopnea [ ] Cough  Allergies    No Known Allergies    Intolerances    CV: [ ] Chest pain [ ] Palpitation [ ] Lightheadedness [ ] Syncope  Renal: [ ] Thirst [ ] Edema  GI: [ ] Nausea [ ] Emesis [ ] Abdominal pain [ ] Constipation [ ] Diarrhea  Hem: [ ] Hematemesis [ ] bBright red blood per rectum  ID: [ ] Fever [ ] Chills [ ] Dysuria  ENT: [ ] Rhinorrhea    ALLERGIES: Allergies    No Known Allergies    Intolerances    DEVICES:   [] Restraints [x] PIVs [] ET tube [] central line [] PICC [] arterial line [] pinzon [] NGT/OGT [] EVD [] LD [] LOUISA/HMV [] LiCOX [] ICP monitor [] Trach [] PEG [] Chest Tube [] other:    T(C): 36.6 (22 @ 08:00), Max: 37.3 (22 @ 03:00)  HR: 72 (22 @ 10:00) (56 - 84)  BP: 162/74 (22 @ 10:00) (104/54 - 162/87)  RR: 13 (22 @ 10:00) (12 - 17)  SpO2: 100% (22 @ 10:00) (98% - 100%)  22 @ 07:01  -  22 @ 07:00  --------------------------------------------------------  IN: 700 mL / OUT: 1970 mL / NET: -1270 mL    22 @ 07:01  -  22 @ 10:23  --------------------------------------------------------  IN: 0 mL / OUT: 15 mL / NET: -15 mL    acetaminophen     Tablet .. 1000 milliGRAM(s) Oral every 6 hours PRN  bisacodyl 5 milliGRAM(s) Oral daily  ceFAZolin   IVPB 1000 milliGRAM(s) IV Intermittent every 8 hours  chlorhexidine 4% Liquid 1 Application(s) Topical <User Schedule>  enoxaparin Injectable 40 milliGRAM(s) SubCutaneous <User Schedule>  levETIRAcetam 500 milliGRAM(s) Oral two times a day  metoclopramide Injectable 10 milliGRAM(s) IV Push every 8 hours PRN  ondansetron Injectable 4 milliGRAM(s) IV Push every 6 hours PRN  polyethylene glycol 3350 17 Gram(s) Oral two times a day  senna 2 Tablet(s) Oral at bedtime  sodium chloride 0.65% Nasal 1 Spray(s) Both Nostrils three times a day PRN  traMADol 25 milliGRAM(s) Oral every 4 hours PRN  traMADol 50 milliGRAM(s) Oral every 4 hours PRN      EXAMINATION:  General: No acute distress  HEENT: Anicteric sclerae  Cardiac: [id number]  Lungs: Clear  Abdomen: Soft, non-tender, +BS  Extremities: No c/c/e  Skin/Incision Site: Clean, dry and intact  Neurologic: Awake, alert, fully oriented, follows commands, PERRL, EOMI, face symmetric, no drift, BUE 5/5, LLE 5/5 but has a drift , RLE 5/5, nl sensation         LABS:  Na: 142 ( @ :48), 140 ( @ 08:16), 141 ( @ 22:51)  K: 4.1 (:48), 3.8 ( @ 08:16), 4.0 (:51)  Cl: 109 (:48), 106 ( @ 08:16), 106 ( @ 22:51)  CO2: 26 (:48), 26 ( @ 08:16), 25 ( @ 22:51)  BUN: 10 (:48), 8 ( @ 08:16), 11 ( @ :51)  Cr: 0.60 (:48), 0.61 ( @ 08:16), 0.59 ( @ 22:51)  Glu: 115(:48), 109( @ 08:16), 146( 22:51)    Hgb: 10.2 (:48), 9.8 ( @ 08:16), 11.3 ( 22:51)  Hct: 32.7 (:48), 31.0 (09-23 @ 08:16), 35.9 ( @ 22:51)  WBC: 6.89 ( @ 01:48), 8.84 ( @ 08:16), 9.34 ( @ 22:51)  Plt: 196 ( @ 01:48), 197 ( @ 08:16), 245 ( @ 22:51)    INR:   PTT:                   ASSESSMENT:   51 year old female  POD3 large skullbase defect, encephalocele w LD repaired w nasoseptal flap    NEURO:  nchecks q 4 hr  CSF leak LD 5 cc/hr clamp LD tonight   monitor for CSF leak   keppra 500 bid sz ppx per NS   Activity: [x] OOB as tolerated [] Bedrest [x] PT [x] OT [] PMNR    PULM: RA  pituitary precaution     CV:  Keep -160mmHg    RENAL:  IVL      GI:  Diet: reg diet  GI prophylaxis [x] not indicated [] PPI [] other:  miralax and senna     ENDO:   Goal euglycemia (-180)    HEME/ONC: LED check  VTE prophylaxis: lovenox 40 mg sc qhs     ID:  continue ancef due to nasal packing       SOCIAL/FAMILY:  [x] awaiting [] updated at bedside [] family meeting    CODE STATUS:  [x] Full Code [] DNR [] DNI [] Palliative/Comfort Care    not critical

## 2022-09-26 NOTE — PROGRESS NOTE ADULT - ASSESSMENT
ASSESSMENT:   51 year old female presents for preop testing for endonasal and transcranial repair of CSF leak with abdominal fat graft/ lumbar drain for cranial cerebrospinal fluid leak on 9/22/2022. Pt was originally scheduled back in June 2022 for nasal polypectomy and brain MRI revealed a leakage. She c/o chronic sinusitis, occasional "brain fog," no abnormal gait, no weakness, no headaches at present. Her medical history significant for obesity s/p bariatric surgery- gastric sleeve 6/3/2021, heartburn, previous covid-19 (2/2021, symptoms included fever, SOB, headaches, no hospitalization, no intubation). rhinoplasty 8 yrs ago  She denies any symptoms of covid-19, and scheduled for PCR test on 9/19/2022 (15 Sep 2022 16:32)      POD3 large skullbase defect, encephalocele w LD repaired w nasoseptal flap    NEURO:  [id number]  d/c LD today  tramadol prn for pain control, tylenol IV  MRI spine showed no paraspinal fluid  keppra 500 bid sz ppx x7d  HOB 30  Activity: [x] OOB as tolerated [] Bedrest [x] PT [x] OT [] PMNR    PULM: RA, no IS  mobilize as tolerated    CV:  Keep -150mmHg, d/c a-line     RENAL:  IVL    GI:  Diet: reg diet  GI prophylaxis [x] not indicated [] PPI [] other:  Bowel regimen standing     ENDO:   Goal euglycemia (-180)    HEME/ONC: LED check  VTE prophylaxis: [x] SCDs  hold lovenox ppx fresh post op    ID:  Marly-op antibiotics, nafcillin    MISC:    SOCIAL/FAMILY:  [x] awaiting [] updated at bedside [] family meeting    CODE STATUS:  [x] Full Code [] DNR [] DNI [] Palliative/Comfort Care    DISPOSITION:  [] ICU [] Stroke Unit [x] Floor [] EMU [] RCU [] PCU    [x] Patient is at high risk of neurologic deterioration/death due to: meningitis, overdrainage brain sag and SDH      Contact: [phone]

## 2022-09-27 ENCOUNTER — TRANSCRIPTION ENCOUNTER (OUTPATIENT)
Age: 52
End: 2022-09-27

## 2022-09-27 VITALS
HEART RATE: 83 BPM | DIASTOLIC BLOOD PRESSURE: 72 MMHG | OXYGEN SATURATION: 100 % | RESPIRATION RATE: 18 BRPM | SYSTOLIC BLOOD PRESSURE: 109 MMHG | TEMPERATURE: 98 F

## 2022-09-27 PROBLEM — G96.08: Chronic | Status: ACTIVE | Noted: 2022-09-15

## 2022-09-27 PROBLEM — Z87.09 PERSONAL HISTORY OF OTHER DISEASES OF THE RESPIRATORY SYSTEM: Chronic | Status: ACTIVE | Noted: 2022-09-15

## 2022-09-27 PROBLEM — U07.1 COVID-19: Chronic | Status: ACTIVE | Noted: 2022-06-09

## 2022-09-27 LAB
ANION GAP SERPL CALC-SCNC: 9 MMOL/L — SIGNIFICANT CHANGE UP (ref 5–17)
BUN SERPL-MCNC: 14 MG/DL — SIGNIFICANT CHANGE UP (ref 7–23)
CALCIUM SERPL-MCNC: 9 MG/DL — SIGNIFICANT CHANGE UP (ref 8.4–10.5)
CHLORIDE SERPL-SCNC: 105 MMOL/L — SIGNIFICANT CHANGE UP (ref 96–108)
CO2 SERPL-SCNC: 26 MMOL/L — SIGNIFICANT CHANGE UP (ref 22–31)
CREAT SERPL-MCNC: 0.84 MG/DL — SIGNIFICANT CHANGE UP (ref 0.5–1.3)
EGFR: 84 ML/MIN/1.73M2 — SIGNIFICANT CHANGE UP
GLUCOSE SERPL-MCNC: 136 MG/DL — HIGH (ref 70–99)
HCT VFR BLD CALC: 36.1 % — SIGNIFICANT CHANGE UP (ref 34.5–45)
HGB BLD-MCNC: 11.4 G/DL — LOW (ref 11.5–15.5)
MAGNESIUM SERPL-MCNC: 1.9 MG/DL — SIGNIFICANT CHANGE UP (ref 1.6–2.6)
MCHC RBC-ENTMCNC: 27.8 PG — SIGNIFICANT CHANGE UP (ref 27–34)
MCHC RBC-ENTMCNC: 31.6 GM/DL — LOW (ref 32–36)
MCV RBC AUTO: 88 FL — SIGNIFICANT CHANGE UP (ref 80–100)
NRBC # BLD: 0 /100 WBCS — SIGNIFICANT CHANGE UP (ref 0–0)
PHOSPHATE SERPL-MCNC: 4 MG/DL — SIGNIFICANT CHANGE UP (ref 2.5–4.5)
PLATELET # BLD AUTO: 252 K/UL — SIGNIFICANT CHANGE UP (ref 150–400)
POTASSIUM SERPL-MCNC: 4.1 MMOL/L — SIGNIFICANT CHANGE UP (ref 3.5–5.3)
POTASSIUM SERPL-SCNC: 4.1 MMOL/L — SIGNIFICANT CHANGE UP (ref 3.5–5.3)
RBC # BLD: 4.1 M/UL — SIGNIFICANT CHANGE UP (ref 3.8–5.2)
RBC # FLD: 14.5 % — SIGNIFICANT CHANGE UP (ref 10.3–14.5)
SODIUM SERPL-SCNC: 140 MMOL/L — SIGNIFICANT CHANGE UP (ref 135–145)
WBC # BLD: 6.24 K/UL — SIGNIFICANT CHANGE UP (ref 3.8–10.5)
WBC # FLD AUTO: 6.24 K/UL — SIGNIFICANT CHANGE UP (ref 3.8–10.5)

## 2022-09-27 PROCEDURE — 85027 COMPLETE CBC AUTOMATED: CPT

## 2022-09-27 PROCEDURE — C9399: CPT

## 2022-09-27 PROCEDURE — 72148 MRI LUMBAR SPINE W/O DYE: CPT

## 2022-09-27 PROCEDURE — 81005 URINALYSIS: CPT

## 2022-09-27 PROCEDURE — 80048 BASIC METABOLIC PNL TOTAL CA: CPT

## 2022-09-27 PROCEDURE — 97110 THERAPEUTIC EXERCISES: CPT

## 2022-09-27 PROCEDURE — C1769: CPT

## 2022-09-27 PROCEDURE — 97116 GAIT TRAINING THERAPY: CPT

## 2022-09-27 PROCEDURE — 36415 COLL VENOUS BLD VENIPUNCTURE: CPT

## 2022-09-27 PROCEDURE — C1729: CPT

## 2022-09-27 PROCEDURE — 83735 ASSAY OF MAGNESIUM: CPT

## 2022-09-27 PROCEDURE — 97166 OT EVAL MOD COMPLEX 45 MIN: CPT

## 2022-09-27 PROCEDURE — 93970 EXTREMITY STUDY: CPT

## 2022-09-27 PROCEDURE — 70450 CT HEAD/BRAIN W/O DYE: CPT

## 2022-09-27 PROCEDURE — 88305 TISSUE EXAM BY PATHOLOGIST: CPT

## 2022-09-27 PROCEDURE — 99222 1ST HOSP IP/OBS MODERATE 55: CPT

## 2022-09-27 PROCEDURE — 70496 CT ANGIOGRAPHY HEAD: CPT

## 2022-09-27 PROCEDURE — 97163 PT EVAL HIGH COMPLEX 45 MIN: CPT

## 2022-09-27 PROCEDURE — 88331 PATH CONSLTJ SURG 1 BLK 1SPC: CPT

## 2022-09-27 PROCEDURE — 84100 ASSAY OF PHOSPHORUS: CPT

## 2022-09-27 PROCEDURE — C1889: CPT

## 2022-09-27 RX ORDER — TRAMADOL HYDROCHLORIDE 50 MG/1
1 TABLET ORAL
Qty: 42 | Refills: 0
Start: 2022-09-27 | End: 2022-10-03

## 2022-09-27 RX ORDER — LEVETIRACETAM 250 MG/1
1 TABLET, FILM COATED ORAL
Qty: 6 | Refills: 0
Start: 2022-09-27 | End: 2022-09-29

## 2022-09-27 RX ORDER — TRAMADOL HYDROCHLORIDE 50 MG/1
0.5 TABLET ORAL
Qty: 0 | Refills: 0 | DISCHARGE
Start: 2022-09-27

## 2022-09-27 RX ORDER — ACETAMINOPHEN 500 MG
2 TABLET ORAL
Qty: 0 | Refills: 0 | DISCHARGE
Start: 2022-09-27

## 2022-09-27 RX ORDER — ACETAMINOPHEN 500 MG
2 TABLET ORAL
Qty: 0 | Refills: 0 | DISCHARGE

## 2022-09-27 RX ORDER — POLYETHYLENE GLYCOL 3350 17 G/17G
17 POWDER, FOR SOLUTION ORAL
Qty: 0 | Refills: 0 | DISCHARGE
Start: 2022-09-27

## 2022-09-27 RX ORDER — SENNA PLUS 8.6 MG/1
2 TABLET ORAL
Qty: 0 | Refills: 0 | DISCHARGE
Start: 2022-09-27

## 2022-09-27 RX ADMIN — Medication 1000 MILLIGRAM(S): at 05:46

## 2022-09-27 RX ADMIN — Medication 1 SPRAY(S): at 05:17

## 2022-09-27 RX ADMIN — Medication 1000 MILLIGRAM(S): at 15:10

## 2022-09-27 RX ADMIN — LEVETIRACETAM 500 MILLIGRAM(S): 250 TABLET, FILM COATED ORAL at 05:17

## 2022-09-27 RX ADMIN — Medication 1000 MILLIGRAM(S): at 05:16

## 2022-09-27 NOTE — DISCHARGE NOTE PROVIDER - NSDCFUADDINST_GEN_ALL_CORE_FT
Please avoid nasal trauma, donot put anything up the nose, do not blow your nose, avoid sneezing, donot use straws or incentive spirometers. If you notice nasal drainage, post nasal drainage, or metallic/salty taste please notify your neurosurgeon and/or ENT.    Return to Emergency Department or contact your Neurosurgeon if any changes in mental status, weakness, numbness or tingling of extremities; difficulty swallowing; drainage or redness of wound, fever; pain in legs; difficulty urinating or constipation.  Donot restart your Aspirin or take any Motrin/NSAIDS until checking with your Neurosurgeon.  No strenous activity. No heavy lifting. Do not return to work until cleared by physician. No driving until cleared by physician.    Remove dressing on 3rd day after your surgery and leave incision open to air. You may shower on the 3rd day after you surgery.  No soaking in tub,  Donot remove steri strips if present.  Donot apply any ointements to incision.    You have been started on a new medication-Keppra.  Keppra helps control and prevent seizures.  The most common side effects include diarrhea or constipation, excessive sleepiness, irritability and mood changes.  Rare and sometimes serious side effects include rash.

## 2022-09-27 NOTE — PROGRESS NOTE ADULT - ASSESSMENT
52 YO with PMH/PSH of obesity s/p bariatric surgery- gastric sleeve 6/3/2021, GERD, rhinoplasty 8 yrs ago presents with CSF Rhinorrhea. Now S/P Endonasal CSF Leak Repair POD #5. Repaired with Right middle Turb flap and Nasopore/Merocel. LD clamped. Pt was evaluated at bedside. C/o frontal HA, relieved with pain meds. No CSF Leak noted.

## 2022-09-27 NOTE — DISCHARGE NOTE NURSING/CASE MANAGEMENT/SOCIAL WORK - NSDCPEFALRISK_GEN_ALL_CORE
For information on Fall & Injury Prevention, visit: [email or web address] OR  [email or web address] OR  https://www.cdc.gov/steadi/patient.html

## 2022-09-27 NOTE — DISCHARGE NOTE PROVIDER - NSDCCPCAREPLAN_GEN_ALL_CORE_FT
PRINCIPAL DISCHARGE DIAGNOSIS  Diagnosis: Other cranial cerebrospinal fluid leak  Assessment and Plan of Treatment:

## 2022-09-27 NOTE — DISCHARGE NOTE PROVIDER - HOSPITAL COURSE
51 year old female presents for preop testing for endonasal and transcranial repair of CSF leak with abdominal fat graft/ lumbar drain for cranial cerebrospinal fluid leak on 9/22/2022. Pt was originally scheduled back in June 2022 for nasal polypectomy and brain MRI revealed a leakage. She c/o chronic sinusitis, occasional "brain fog," no abnormal gait, no weakness, no headaches at present. Her medical history significant for obesity s/p bariatric surgery- gastric sleeve 6/3/2021, heartburn, previous covid-19 (2/2021, symptoms included fever, SOB, headaches, no hospitalization, no intubation).  She denies any symptoms of covid-19, and scheduled for PCR test on 9/19/2022 (15 Sep 2022 16:32)    PROCEDURE: Adm 9/22 Endonasal and Transcranial Repair, CSF leak w/abd fat graft and middle turbinate graft by ENT. Lumbar drain DC'd 9/26     POD#5    PLAN:  Neuro: 9/26 Nasal packing DC'd. 9/26 Lum Drain DC'd. Anemia trending upwards. DC LD Suture 10/1.  Can stop Keppra after 7days postop. Inc activity/OOB. Pt was seen by PT/OT and Recomm A. Rehab. PMR-P. But pt amb well and would like to be discharged home today.  made aware will FU after PT FU to see if need any Home care needs.  Plan for DC Home today.  Above D/W Dr Jimenez.    ENT note of 9/27- Problem/Plan - 1: ·  Problem: CSF leak. ·  Plan: - Monitor for CSF leak. - TSRP Precautions (no incentive spirometry, no straws, no BIPAP) - Humidified O2 prn.   - Pain meds prn. - ENT will follow. - Call with questions. - F/u outpatient with Dr. Woodward in 2 weeks. Electronic Signatures: Marah Woodward)     Respiratory: Patient instructed to use incentive spirometer [ ] YES [ X] NO              DVT ppx: [X ] SQL [ ] SQH and Venodynes [ ] Left [ ] Right [ X] Bilateral    Discharge Planning:  The patient was evaluated by PT/OT and recommended Acute Rehab.  PMR eval-P, but pt wants to be discharged home. PT FU and  will see pt for any Home Care needs FU.   She was subsequently DC on          in stable condition. 51 year old female presents for preop testing for endonasal and transcranial repair of CSF leak with abdominal fat graft/ lumbar drain for cranial cerebrospinal fluid leak on 9/22/2022. Pt was originally scheduled back in June 2022 for nasal polypectomy and brain MRI revealed a leakage. She c/o chronic sinusitis, occasional "brain fog," no abnormal gait, no weakness, no headaches at present. Her medical history significant for obesity s/p bariatric surgery- gastric sleeve 6/3/2021, heartburn, previous covid-19 (2/2021, symptoms included fever, SOB, headaches, no hospitalization, no intubation).  She denies any symptoms of covid-19, and scheduled for PCR test on 9/19/2022 (15 Sep 2022 16:32)    PROCEDURE: Adm 9/22 Endonasal and Transcranial Repair, CSF leak w/abd fat graft and middle turbinate graft by ENT. Lumbar drain DC'd 9/26     POD#5    PLAN:  Neuro: 9/26 Nasal packing DC'd. 9/26 Lum Drain DC'd. Anemia trending upwards. DC LD Suture 10/1.  Can stop Keppra after 7days postop. Inc activity/OOB. Pt was seen by PT/OT and Recomm A. Rehab. PMR-P. Pt seen by PT this AM and recomm Outpt PT w/RW and Shower Chair. DC Home today.  Above D/W Dr Jimenez.    ENT note of 9/27- Problem/Plan - 1: ·  Problem: CSF leak. ·  Plan: - Monitor for CSF leak. - TSRP Precautions (no incentive spirometry, no straws, no BIPAP) - Humidified O2 prn.   - Pain meds prn. - ENT will follow. - Call with questions. - F/u outpatient with Dr. Woodward in 2 weeks. Electronic Signatures: Marah Woodward (MD)     Respiratory: Patient instructed to use incentive spirometer [ ] YES [ X] NO              DVT ppx: [X ] SQL [ ] SQH and Venodynes [ ] Left [ ] Right [ X] Bilateral    Discharge Planning:  The patient was evaluated by PT/OT and recommended Acute Rehab.  PMR eval-P, but pt wants to be discharged home. PT FU and  will see pt for any Home Care needs FU.   She was subsequently DC on          in stable condition.

## 2022-09-27 NOTE — PROGRESS NOTE ADULT - PROVIDER SPECIALTY LIST ADULT
ENT
ENT
NSICU
Neurosurgery
ENT
NSICU
ENT
NSICU
NSICU
Neurosurgery
NSICU
NSICU
ENT
ENT

## 2022-09-27 NOTE — DISCHARGE NOTE PROVIDER - CARE PROVIDERS DIRECT ADDRESSES
[email or web address]

## 2022-09-27 NOTE — PROGRESS NOTE ADULT - SUBJECTIVE AND OBJECTIVE BOX
ENT ISSUE/POD: S/P Endonasal CSF Leak Repair POD #5    HPI: 52 YO with PMH/PSH of obesity s/p bariatric surgery- gastric sleeve 6/3/2021, GERD, rhinoplasty 8 yrs ago presents with CSF Rhinorrhea. Now S/P Endonasal CSF Leak Repair POD #5. Repaired with Right middle Turb flap and Nasopore/Merocel. LD clamped. Pt was evaluated at bedside. C/o frontal HA, relieved with pain meds.  Denies salty/Metallic taste in mouth, fever/chills, clear rhinorrhea, cough, N/V.         PAST MEDICAL & SURGICAL HISTORY:  Fibroid      History of migraine headaches      Chronic ethmoidal sinusitis      History of constipation      2019 novel coronavirus disease (COVID-19)  2021  fever, SOB, headaches, vomiting and diarrhea      History of heartburn      Other cranial cerebrospinal fluid leak      H/O nasal polyp       delivery delivered  x4      History of colonoscopy  x7 years ago  and upper endoscopy      H/O bariatric surgery  gastric sleeve   6/3/2021  prior to surgery weight 230 pounds      History of rhinoplasty  x8 years      H/O arthroscopy of knee  left knee      H/O tubal ligation  x6 years ago        Allergies    No Known Allergies    Intolerances      MEDICATIONS  (STANDING):  bisacodyl 5 milliGRAM(s) Oral daily  enoxaparin Injectable 40 milliGRAM(s) SubCutaneous <User Schedule>  levETIRAcetam 500 milliGRAM(s) Oral two times a day  lidocaine 1% Injectable 10 milliLiter(s) Local Injection once  lidocaine 2% Injectable 10 milliLiter(s) Local Injection once  polyethylene glycol 3350 17 Gram(s) Oral two times a day  senna 2 Tablet(s) Oral at bedtime    MEDICATIONS  (PRN):  acetaminophen     Tablet .. 1000 milliGRAM(s) Oral every 6 hours PRN Temp greater or equal to 38C (100.4F), Mild Pain (1 - 3)  metoclopramide Injectable 10 milliGRAM(s) IV Push every 8 hours PRN Nausea and/or Vomiting, second line  sodium chloride 0.65% Nasal 1 Spray(s) Both Nostrils three times a day PRN Nasal Congestion  traMADol 25 milliGRAM(s) Oral every 4 hours PRN Moderate Pain (4 - 6)  traMADol 50 milliGRAM(s) Oral every 4 hours PRN Severe Pain (7 - 10)      Social History: see consult    Family history: see consult    ROS:   ENT: all negative except as noted in HPI   Pulm: denies SOB, cough, hemoptysis  Neuro: denies numbness/tingling, loss of sensation  Endo: denies heat/cold intolerance, excessive sweating      Vital Signs Last 24 Hrs  T(C): 36.7 (27 Sep 2022 04:48), Max: 36.9 (26 Sep 2022 20:00)  T(F): 98.1 (27 Sep 2022 04:48), Max: 98.5 (26 Sep 2022 20:00)  HR: 71 (27 Sep 2022 04:48) (58 - 83)  BP: 116/78 (27 Sep 2022 04:48) (116/78 - 150/83)  BP(mean): 76 (26 Sep 2022 19:00) (76 - 100)  RR: 18 (27 Sep 2022 04:48) (12 - 19)  SpO2: 98% (27 Sep 2022 04:48) (98% - 100%)    Parameters below as of 27 Sep 2022 04:48  Patient On (Oxygen Delivery Method): room air      PHYSICAL EXAM:  Gen: NAD  Skin: No rashes, bruises, or lesions  Head: Normocephalic, Atraumatic  Face: no edema, erythema, or fluctuance. Parotid glands soft without mass  Eyes: no scleral injection  Nose: R Nasopore in place. Crusted blood in Left Nare. No CSF leak noted on exam.   Mouth: No Stridor / Drooling / Trismus.  Mucosa moist, tongue/uvula midline, dried blood noted in the posterior oropharynx but no active bleeding  Neck: Flat, supple, no lymphadenopathy, trachea midline, no masses  Lymphatic: No lymphadenopathy  Resp: breathing easily, no stridor  Neuro: facial nerve intact, no facial droop

## 2022-09-27 NOTE — DISCHARGE NOTE PROVIDER - CARE PROVIDER_API CALL
Tom Jimenez)  Neurosurgery  [address] [place] [address]  Phone: [phone]  Fax: [phone]  Follow Up Time:     Marah Woodward)  Otolaryngology  [address] [place] [address]  Phone: [phone]  Fax: [phone]  Follow Up Time:

## 2022-09-27 NOTE — DISCHARGE NOTE PROVIDER - NSDCFUSCHEDAPPT_GEN_ALL_CORE_FT
Tom Jimenez Physician [facility]  NEUROSURG [address] [place] [address]  Scheduled Appointment: 10/06/2022

## 2022-09-27 NOTE — DISCHARGE NOTE PROVIDER - NSDCFUADDAPPT_GEN_ALL_CORE_FT
Please call your Neurosurgeon for an appointment within 1 week after your hospital discharge for wound check and further recommendations.    Call ENT DR Woodward for an appointment in 2 weeks.    Followup with your Chino SANTIAGO in 1 week   An appointment was made for you with your Neurosurgeon for a followup visit on 10/6/2022 at 3pm. Please call to confirm your appointment.    Call ENT DR Woodward for an appointment in 2 weeks.    Followup with your Chino SANTIAGO in 1 week

## 2022-09-27 NOTE — DISCHARGE NOTE NURSING/CASE MANAGEMENT/SOCIAL WORK - NSDCFUADDAPPT_GEN_ALL_CORE_FT
Please call your Neurosurgeon for an appointment within 1 week after your hospital discharge for wound check and further recommendations.    Call ENT DR Woodward for an appointment in 2 weeks.    Followup with your Chino SANTIAGO in 1 week

## 2022-09-27 NOTE — PROGRESS NOTE ADULT - SUBJECTIVE AND OBJECTIVE BOX
SUBJECTIVE: Doing well, wants to shower. No CSF Rhinorrhea.  No complaints. Amb independently-wants to be discharged home today.    OVERNIGHT EVENTS: None    Vital Signs Last 24 Hrs  T(C): 36.4 (27 Sep 2022 09:51), Max: 36.9 (26 Sep 2022 20:00)  T(F): 97.6 (27 Sep 2022 09:51), Max: 98.5 (26 Sep 2022 20:00)  HR: 90 (27 Sep 2022 09:51) (62 - 90)  BP: 116/80 (27 Sep 2022 09:51) (116/78 - 139/69)  BP(mean): 76 (26 Sep 2022 19:00) (76 - 80)  RR: 18 (27 Sep 2022 09:51) (17 - 19)  SpO2: 94% (27 Sep 2022 09:51) (94% - 100%)    Parameters below as of 27 Sep 2022 09:51  Patient On (Oxygen Delivery Method): room air    IVF: [X ] IVL [ ] NS+K@   DIET: [X ] Regular + Yougurt supp [ ] CCD [ ] Renal [ ] Puree [ ] Dysphagia [ ] Tube Feeds:   PCA: [ ] YES [X ] NO   PERDOMO: [ ] YES [X ] NO [X] VOID   BM: [ ] YES [X ] NO     I&O's Summary    26 Sep 2022 07:01  -  27 Sep 2022 07:00  --------------------------------------------------------  IN: 220 mL / OUT: 1100 mL / NET: -880 mL    27 Sep 2022 07:01  -  27 Sep 2022 12:47  --------------------------------------------------------  IN: 360 mL / OUT: 0 mL / NET: 360 mL      DRAINS: None    PHYSICAL EXAM:    General: No Acute Distress     Neurological: Awake, alert oriented to person, place and time, Following Commands, PERRL, EOMI, Face Symmetrical, Speech Fluent, Moving all extremities, Muscle Strength normal in all four extremities, except sl weaker LLE. No Drift, Sensation to Light Touch Intact    Pulmonary: Clear to Auscultation, No Rales, No Rhonchi, No Wheezes     Cardiovascular: S1, S2, Regular Rate and Rhythm     Gastrointestinal: Soft, Nontender, Nondistended     Incision: No CSF Rhinorrhea, +steri abd fat graft site-CDI/Flat. LD site with nylon suture-no leak, CDI/Flat.  No staples noted on head/pin site    LABS:                        11.4   6.24  )-----------( 252      ( 27 Sep 2022 10:27 )             36.1    09-27    140  |  105  |  14  ----------------------------<  136<H>  4.1   |  26  |  0.84    Ca    9.0      27 Sep 2022 10:27  Phos  4.0     09-27  Mg     1.9     09-27    COVID-19 PCR: [facility] (19 Sep 2022 17:21)    09-26 @ 07:01  -  09-27 @ 07:00  --------------------------------------------------------  IN: 220 mL / OUT: 1100 mL / NET: -880 mL    09-27 @ 07:01 - 09-27 @ 12:38  --------------------------------------------------------  IN: 360 mL / OUT: 0 mL / NET: 360 mL    IMAGING:     < from: [facility] Duplex Lower Ext Vein Scan, Bilat (09.25.22 @ 12:11) >  No evidence of bilateral lower extremity deep venous thrombosis.    < end of copied text >    < from: MR Lumbar Spine No Cont (09.23.22 @ 14:58) >    1.  L4-L5 diffuse disc bulge and superimposed shallow left central and   subarticular disc protrusion  (disc herniation)    2.  Left ventral nerve root in the proximal canal demonstrates an   atypical mildly ectatic course and is mildly thickened, nonspecific but   possible radiculitis.    3.  No paraspinal fluid collection is found.    4.  Gadolinium-enhanced images may be considered given the clinical   indication    5.  Direct comparison to prior lumbar spine MR imaging will benefit this   evaluation.  When these examinations become available, an addendum will   be provided as appropriate.    < end of copied text >    < from: CT Head No Cont (09.23.22 @ 02:54) >  Findings:  The lateral ventricles have a normal configuration.    There is no evidence of acute hemorrhage, mass or mass-effect in the   posterior fossa or in the supratentorial region.    Evaluation of the osseous structures with the appropriate window appears   unremarkable.    Postoperative changes involving the right nasal cavity is seen with fat   packing. Right maxillary sinus mucosal thickening seen with air-fluid   level. Right ethmoid sinus mucosal thickening is seen. Small air-fluid   levels involving the right frontal sinus    Impression: Postop changes as described above.  Sinus mucosal thickening and air-fluid levels as described above,   otherwise unremarkable noncontrast head CT.    < end of copied text >    MEDICATIONS  (STANDING):  bisacodyl 5 milliGRAM(s) Oral daily  enoxaparin Injectable 40 milliGRAM(s) SubCutaneous <User Schedule>  levETIRAcetam 500 milliGRAM(s) Oral two times a day  lidocaine 1% Injectable 10 milliLiter(s) Local Injection once  lidocaine 2% Injectable 10 milliLiter(s) Local Injection once  polyethylene glycol 3350 17 Gram(s) Oral two times a day  senna 2 Tablet(s) Oral at bedtime    MEDICATIONS  (PRN):  acetaminophen     Tablet .. 1000 milliGRAM(s) Oral every 6 hours PRN Temp greater or equal to 38C (100.4F), Mild Pain (1 - 3)  metoclopramide Injectable 10 milliGRAM(s) IV Push every 8 hours PRN Nausea and/or Vomiting, second line  sodium chloride 0.65% Nasal 1 Spray(s) Both Nostrils three times a day PRN Nasal Congestion  traMADol 25 milliGRAM(s) Oral every 4 hours PRN Moderate Pain (4 - 6)  traMADol 50 milliGRAM(s) Oral every 4 hours PRN Severe Pain (7 - 10)

## 2022-09-27 NOTE — DISCHARGE NOTE PROVIDER - NSDCACTIVITY_GEN_ALL_CORE
Bathing allowed/Do not drive or operate machinery/Stairs allowed/Walking - Indoors allowed/No heavy lifting/straining/Walking - Outdoors allowed/Follow Instructions Provided by your Surgical Team

## 2022-09-27 NOTE — DISCHARGE NOTE PROVIDER - NSDCMRMEDTOKEN_GEN_ALL_CORE_FT
acetaminophen 500 mg oral tablet: 2 tab(s) orally every 6 hours, As needed, Temp greater or equal to 38C (100.4F), Mild Pain (1 - 3)  levETIRAcetam 500 mg oral tablet: 1 tab(s) orally 2 times a day  polyethylene glycol 3350 oral powder for reconstitution: 17 gram(s) orally 2 times a day  senna leaf extract oral tablet: 2 tab(s) orally once a day (at bedtime)  traMADol 50 mg oral tablet: 0.5 tab(s) orally every 4 hours, As needed, Moderate Pain (4 - 6)  traMADol 50 mg oral tablet: Half to 1  tab(s) orally every 4 hours, As needed, for Moderate to Severe Pain  MDD:5   acetaminophen 500 mg oral tablet: 2 tab(s) orally every 6 hours, As needed, Temp greater or equal to 38C (100.4F), Mild Pain (1 - 3)  levETIRAcetam 500 mg oral tablet: 1 tab(s) orally 2 times a day  polyethylene glycol 3350 oral powder for reconstitution: 17 gram(s) orally 2 times a day  Rolling Walker: S/P Endonasal repair CSF leak with abd fat graft and middle turbinate graft        senna leaf extract oral tablet: 2 tab(s) orally once a day (at bedtime)  Shower Chair: S/P S/P Endonasal repair CSF leak with abd fat graft and middle turbinate graft    traMADol 50 mg oral tablet: 0.5 tab(s) orally every 4 hours, As needed, Moderate Pain (4 - 6)  traMADol 50 mg oral tablet: Half to 1  tab(s) orally every 4 hours, As needed, for Moderate to Severe Pain  MDD:5   acetaminophen 500 mg oral tablet: 2 tab(s) orally every 6 hours, As needed, Temp greater or equal to 38C (100.4F), Mild Pain (1 - 3)  levETIRAcetam 500 mg oral tablet: 1 tab(s) orally 2 times a day  Physical Therapy: s/p Endonasal repair CSF leak w/abd and middle turbinate graft    PT Eval &amp; Treatment  polyethylene glycol 3350 oral powder for reconstitution: 17 gram(s) orally 2 times a day  Rolling Walker: S/P Endonasal repair CSF leak with abd fat graft and middle turbinate graft        senna leaf extract oral tablet: 2 tab(s) orally once a day (at bedtime)  Shower Chair: S/P S/P Endonasal repair CSF leak with abd fat graft and middle turbinate graft    traMADol 50 mg oral tablet: 0.5 tab(s) orally every 4 hours, As needed, Moderate Pain (4 - 6)  traMADol 50 mg oral tablet: Half to 1  tab(s) orally every 4 hours, As needed, for Moderate to Severe Pain  MDD:5

## 2022-09-27 NOTE — PROGRESS NOTE ADULT - ASSESSMENT
51 year old female presents for preop testing for endonasal and transcranial repair of CSF leak with abdominal fat graft/ lumbar drain for cranial cerebrospinal fluid leak on 9/22/2022. Pt was originally scheduled back in June 2022 for nasal polypectomy and brain MRI revealed a leakage. She c/o chronic sinusitis, occasional "brain fog," no abnormal gait, no weakness, no headaches at present. Her medical history significant for obesity s/p bariatric surgery- gastric sleeve 6/3/2021, heartburn, previous covid-19 (2/2021, symptoms included fever, SOB, headaches, no hospitalization, no intubation).  She denies any symptoms of covid-19, and scheduled for PCR test on 9/19/2022 (15 Sep 2022 16:32)    PROCEDURE: Adm 9/22 Endonasal and Transcranial Repair, CSF leak w/abd fat graft and middle turbinate graft by ENT. Lumbar drain DC'd 9/26     POD#5    PLAN:  Neuro: 9/26 Nasal packing DC'd. 9/26 Lum Drain DC'd. Anemia trending upwards. DC LD Suture 10/1.  Can stop Keppra after 7days postop. Inc activity/OOB. Pt was seen by PT/OT and Recomm A. Rehab. PMR-P. But pt amb well and would like to be discharged home today.  made aware will FU after PT FU to see if need any Home care needs.  Plan for DC Home today.  Above D/W Dr Jimenez.    ENT note of 9/27- Problem/Plan - 1: ·  Problem: CSF leak. ·  Plan: - Monitor for CSF leak. - TSRP Precautions (no incentive spirometry, no straws, no BIPAP) - Humidified O2 prn.   - Pain meds prn. - ENT will follow. - Call with questions. - F/u outpatient with Dr. Woodward in 2 weeks. Electronic Signatures: Marah Woodward)     Respiratory: Patient instructed to use incentive spirometer [ ] YES [ X] NO              DVT ppx: [X ] SQL [ ] SQH and Venodynes [ ] Left [ ] Right [ X] Bilateral    Discharge Planning:  The patient was evaluated by PT/OT and recommended Acute Rehab.  PMR eval-P, but pt wants to be discharged home. PT FU and  will see pt for any Home Care needs FU.   She was subsequently DC on          in stable condition.    More than 30 minutes spent on total encounter: more than 50% of the visit was spent on educating the patient and family regarding condition, medications, follow up plans, signs and symptoms to be concerned with, preparing paperwork, and questions answered regarding discharge.       51 year old female presents for preop testing for endonasal and transcranial repair of CSF leak with abdominal fat graft/ lumbar drain for cranial cerebrospinal fluid leak on 9/22/2022. Pt was originally scheduled back in June 2022 for nasal polypectomy and brain MRI revealed a leakage. She c/o chronic sinusitis, occasional "brain fog," no abnormal gait, no weakness, no headaches at present. Her medical history significant for obesity s/p bariatric surgery- gastric sleeve 6/3/2021, heartburn, previous covid-19 (2/2021, symptoms included fever, SOB, headaches, no hospitalization, no intubation).  She denies any symptoms of covid-19, and scheduled for PCR test on 9/19/2022 (15 Sep 2022 16:32)    PROCEDURE: Adm 9/22 Endonasal and Transcranial Repair, CSF leak w/abd fat graft and middle turbinate graft by ENT. Lumbar drain DC'd 9/26     POD#5    PLAN:  Neuro: 9/26 Nasal packing DC'd. 9/26 Lum Drain DC'd. Anemia trending upwards. DC LD Suture 10/1.  Can stop Keppra after 7days postop. Inc activity/OOB. Pt was seen by PT/OT and Recomm A. Rehab. PMR-P. But pt amb well and would like to be discharged home today.  made aware will FU after PT FU to see if need any Home care needs.  Plan for DC Home today.  Above D/W Dr Jimenez.    Addendum 1344: Pt seen by PT and recomm Outpt PT w/RW and Shower Chair. DC Home today.     ENT note of 9/27- Problem/Plan - 1: ·  Problem: CSF leak. ·  Plan: - Monitor for CSF leak. - TSRP Precautions (no incentive spirometry, no straws, no BIPAP) - Humidified O2 prn.   - Pain meds prn. - ENT will follow. - Call with questions. - F/u outpatient with Dr. Woodward in 2 weeks. Electronic Signatures: Marah Woodward)     Respiratory: Patient instructed to use incentive spirometer [ ] YES [ X] NO              DVT ppx: [X ] SQL [ ] SQH and Venodynes [ ] Left [ ] Right [ X] Bilateral    Discharge Planning:  The patient was evaluated by PT/OT and recommended Acute Rehab.  PMR eval-P, but pt wants to be discharged home. PT FU and  will see pt for any Home Care needs FU.   She was subsequently DC on          in stable condition.    More than 30 minutes spent on total encounter: more than 50% of the visit was spent on educating the patient and family regarding condition, medications, follow up plans, signs and symptoms to be concerned with, preparing paperwork, and questions answered regarding discharge.

## 2022-09-27 NOTE — CONSULT NOTE ADULT - ASSESSMENT
S/p elective surgery for CSF leak repair, no complications.  Doing better, walking well.  No need for inpatient rehabilitation.  Can return home to community with family caregiver help, DME (walker), and home services visit (e.g., nsg or PT to confirm safe mobility at home).

## 2022-09-27 NOTE — DISCHARGE NOTE NURSING/CASE MANAGEMENT/SOCIAL WORK - PATIENT PORTAL LINK FT
You can access the FollowMyHealth Patient Portal offered by [facility] by registering at the following website: [email or web address]. By joining [facility]’s FollowMyHealth portal, you will also be able to view your health information using other applications (apps) compatible with our system.

## 2022-09-27 NOTE — CONSULT NOTE ADULT - SUBJECTIVE AND OBJECTIVE BOX
CC: " "  Patient is a 51y old  Female who presents with a chief complaint of csf leak (26 Sep 2022 08:55)      Chart reviewed.  Pt will be seen by PM&R later today 2022; recommendations at that time.    Hx as per pt, who is a good historian, and per chart.  Pt unable to provide any meaningful/reliable hx; hx per chart.    HPI:  51 year old female presents for preop testing for endonasal and transcranial repair of CSF leak with abdominal fat graft/ lumbar drain for cranial cerebrospinal fluid leak on 2022. Pt was originally scheduled back in 2022 for nasal polypectomy and brain MRI revealed a leakage. She c/o chronic sinusitis, occasional "brain fog," no abnormal gait, no weakness, no headaches at present. Her medical history significant for obesity s/p bariatric surgery- gastric sleeve 6/3/2021, heartburn, previous covid-19 (2021, symptoms included fever, SOB, headaches, no hospitalization, no intubation).  She denies any symptoms of covid-19, and scheduled for PCR test on 2022 (15 Sep 2022 16:32)      Functionally, the pt is       REVIEW OF SYSTEMS:    Diet:   Bladder:   Bowel:     All other systems negative.      PAST MEDICAL & SURGICAL HISTORY  Fibroid    History of migraine headaches    Chronic ethmoidal sinusitis    History of constipation    2019 novel coronavirus disease (COVID-19)    History of heartburn    Other cranial cerebrospinal fluid leak    H/O nasal polyp    H/O myomectomy     delivery delivered    History of colonoscopy    H/O bariatric surgery    History of rhinoplasty    H/O arthroscopy of knee    H/O tubal ligation          SOCIAL/FUNCTIONAL HISTORY  See above.      FAMILY HISTORY   Per chart: Family hx of hypertension (Mother)    FHx: heart disease (Father)    FH: diabetes mellitus (Mother)          ALLERGIES  Per chart: No Known Allergies        MEDICATIONS   acetaminophen     Tablet .. 1000 milliGRAM(s) Oral every 6 hours PRN  bisacodyl 5 milliGRAM(s) Oral daily  enoxaparin Injectable 40 milliGRAM(s) SubCutaneous <User Schedule>  levETIRAcetam 500 milliGRAM(s) Oral two times a day  lidocaine 1% Injectable 10 milliLiter(s) Local Injection once  lidocaine 2% Injectable 10 milliLiter(s) Local Injection once  metoclopramide Injectable 10 milliGRAM(s) IV Push every 8 hours PRN  polyethylene glycol 3350 17 Gram(s) Oral two times a day  senna 2 Tablet(s) Oral at bedtime  sodium chloride 0.65% Nasal 1 Spray(s) Both Nostrils three times a day PRN  traMADol 25 milliGRAM(s) Oral every 4 hours PRN  traMADol 50 milliGRAM(s) Oral every 4 hours PRN      ----------------------------------------------------------------------------------------  VITALS  T(C): 36.4 (22 @ 09:51), Max: 36.9 (22 @ 20:00)  HR: 90 (22 @ 09:51) (62 - 90)  BP: 116/80 (22 @ 09:51) (116/78 - 139/69)  RR: 18 (22 @ 09:51) (17 - 19)  SpO2: 94% (22 @ 09:51) (94% - 100%)  Wt(kg): --    PHYSICAL EXAM (**to be updated**)  Constitutional - NAD, comfortable  HEENT - NCAT  Respiratory - Breathing comfortably, on ambient air  Extremities - No cyanosis/edema, no calf tenderness b/l  Neurologic Exam -                    Cognitive - AAOx3, cognition grossly intact, follows all simple commands     Communication - Fluent, no dysarthria, no aphasia     Motor -                        Sensory - Grossly intact to LT x 4 distal limbs  Psychiatric - Mood stable, Affect WNL      RECENT LABS/IMAGING  CBC Full  -  ( 27 Sep 2022 10:27 )  WBC Count : 6.24 K/uL  Hemoglobin : 11.4 g/dL  Hematocrit : 36.1 %  Platelet Count - Automated : 252 K/uL          140  |  105  |  14  ----------------------------<  136<H>  4.1   |  26  |  0.84    Ca    9.0      27 Sep 2022 10:27  Phos  4.0       Mg     1.9              CC: "I want to go home."    Chart reviewed.  Pt seen/examined.  Hx as per pt, who is a good historian, and per chart.    HPI:  The pt is s/p endonasal and transcranial repair of a CSF leak that was causing R nasal rhinorrhea and congestion.  There is a history of rhinoplasty 8 years ago.  Her R nasal symptoms are better but not resolved.  She endorses significantly reduced rhinorrhea.  She had a little difficulty walking earlier in the course of her stay but is now walking well in her opinion.  She would like to go home.  She has adult and teenage children at home, and at least 1 will be there to help at any given time.  Her aunt is also coming to stay with her.  Her baseline is independent without an assistive device.      REVIEW OF SYSTEMS:    Diet: eating well, no issues.  Bladder: everything is working fine, no problems.  Bowel: No BM since surgery, but passing gas, no constipation, and generally doesn't have daily BMs.  Pt not concerned.    All other systems negative.      PAST MEDICAL & SURGICAL HISTORY  Fibroid  History of migraine headaches  Chronic ethmoidal sinusitis  History of constipation  Coronavirus disease (COVID-19)  History of heartburn  Other cranial cerebrospinal fluid leak  H/O nasal polyp  H/O myomectomy   delivery delivered  History of colonoscopy  H/O bariatric surgery  History of rhinoplasty  H/O arthroscopy of knee  H/O tubal ligation      SOCIAL/FUNCTIONAL HISTORY  See above.      FAMILY HISTORY   Per chart: Family hx of hypertension (Mother)  FHx: heart disease (Father)  FH: diabetes mellitus (Mother)      ALLERGIES  Per chart: No Known Allergies      MEDICATIONS   acetaminophen     Tablet .. 1000 milliGRAM(s) Oral every 6 hours PRN  bisacodyl 5 milliGRAM(s) Oral daily  enoxaparin Injectable 40 milliGRAM(s) SubCutaneous <User Schedule>  levETIRAcetam 500 milliGRAM(s) Oral two times a day  lidocaine 1% Injectable 10 milliLiter(s) Local Injection once  lidocaine 2% Injectable 10 milliLiter(s) Local Injection once  metoclopramide Injectable 10 milliGRAM(s) IV Push every 8 hours PRN  polyethylene glycol 3350 17 Gram(s) Oral two times a day  senna 2 Tablet(s) Oral at bedtime  sodium chloride 0.65% Nasal 1 Spray(s) Both Nostrils three times a day PRN  traMADol 25 milliGRAM(s) Oral every 4 hours PRN  traMADol 50 milliGRAM(s) Oral every 4 hours PRN      ----------------------------------------------------------------------------------------  VITALS  T(C): 36.4 (22 @ 09:51), Max: 36.9 (22 @ 20:00)  HR: 90 (22 @ 09:51) (62 - 90)  BP: 116/80 (22 @ 09:51) (116/78 - 139/69)  RR: 18 (22 @ 09:51) (17 - 19)  SpO2: 94% (22 @ 09:51) (94% - 100%)  Wt(kg): --    PHYSICAL EXAM  Constitutional - NAD, comfortable  HEENT - NCAT  Respiratory - Breathing comfortably, on ambient air  Extremities - No cyanosis/edema, no calf tenderness b/l  Neurologic Exam -                    Cognitive - Awake, alert, cognition grossly intact, follows all simple commands     Communication - Fluent, no dysarthria, no aphasia     Motor - 5/5 x 4 limbs, prox and distal     Sensory - Grossly intact to LT x 4 distal limbs  Psychiatric - Mood stable, Affect WNL  Functional: Txr Ind sit to stand.  Ambulated in room w/o any difficulty/imbalance, no AD, independent level.  Walker at bedside for extra support, given by PT.    RECENT LABS/IMAGING  CBC Full  -  ( 27 Sep 2022 10:27 )  WBC Count : 6.24 K/uL  Hemoglobin : 11.4 g/dL  Hematocrit : 36.1 %  Platelet Count - Automated : 252 K/uL        140  |  105  |  14  ----------------------------<  136<H>  4.1   |  26  |  0.84    Ca    9.0      27 Sep 2022 10:27  Phos  4.0       Mg     1.9

## 2022-10-06 ENCOUNTER — APPOINTMENT (OUTPATIENT)
Dept: NEUROSURGERY | Facility: CLINIC | Age: 52
End: 2022-10-06

## 2022-10-06 ENCOUNTER — NON-APPOINTMENT (OUTPATIENT)
Age: 52
End: 2022-10-06

## 2022-10-06 PROCEDURE — 99024 POSTOP FOLLOW-UP VISIT: CPT

## 2022-10-06 NOTE — HISTORY OF PRESENT ILLNESS
[de-identified] : 51F, no major PMH, PSH of nasal surgery/rhinoplasty 15 years ago. 5 years ago developed copious watery drainage from nose, a/w positional headache, found to have nasal CSF leak w/ large encephalocele, s/p endoscopic endonasal repair of CSF leak and removal of encephalocele with abdominal fat graft and lumbar drain on 9/22/22. Patient developed mild LLE weakness after surgery, likely from lumbosacral nerve root irritation from lumbar drain. \par \par Neuro Exam: \par \par L IP 4-/5, KE 4/5, otherwise neurologically intact\par incisions c/d/i\par

## 2022-10-06 NOTE — ASSESSMENT
[FreeTextEntry1] : 51F,  s/p endoscopic endonasal repair of CSF leak and removal of encephalocele with abdominal fat graft and lumbar drain on 9/22/22, c/b mild LLE weakness 2/2 lumbar drain nerve root iritation\par \par - MRI brain/orbit/sinus to evaluate sinuses, leak, and encephalocele, in 4 weeks, RTC at that time\par - PT/OT every week\par \par Patient seen and discussed with attending, Dr. Jimenez\par Spent a total of 45 minutes with patient\par \par

## 2022-10-07 ENCOUNTER — APPOINTMENT (OUTPATIENT)
Dept: OTOLARYNGOLOGY | Facility: CLINIC | Age: 52
End: 2022-10-07

## 2022-10-07 VITALS
BODY MASS INDEX: 28.89 KG/M2 | HEIGHT: 62 IN | SYSTOLIC BLOOD PRESSURE: 123 MMHG | WEIGHT: 157 LBS | DIASTOLIC BLOOD PRESSURE: 81 MMHG | TEMPERATURE: 97.7 F | HEART RATE: 65 BPM

## 2022-10-07 PROCEDURE — 31231 NASAL ENDOSCOPY DX: CPT | Mod: 58

## 2022-10-07 PROCEDURE — 99024 POSTOP FOLLOW-UP VISIT: CPT

## 2022-10-07 NOTE — PROCEDURE
[FreeTextEntry6] : Flexible scope #34 was used. Right nasal passage with normal inferior turbinate. Postsurgical changes to middle and superior turbinates. Nasal passage patent with crusting. Crusting at right frontal recess, No CSF leak. Left nasal passage with normal inferior, middle and superior turbinates. Nasal passage was patent with clear middle meatus and sphenoethmoid recess. No mucopurulence or polyps appreciated. Nasopharynx clear.  No evidence of CSF leak.\par \par

## 2022-10-07 NOTE — HISTORY OF PRESENT ILLNESS
[de-identified] : S/P CSF leak repair with Dr. Jimenez 9/22/22 - Path, neural tissue, chronic inflammation and nasal polypoid tissue. \par Doing well, no clear nasal d/cm, mild congestion.  Sense of smell and taste are good.  using nasal saline but not sinus wash.

## 2022-10-07 NOTE — ASSESSMENT
[FreeTextEntry1] : S/P CSF leak repair with Dr. Jimenez 9/22/22:\par - Use Sinus wash BID\par - F/U 3-4 weeks. \par \par \par I personally saw and examined Ms. RILEY LOPEZ in detail this visit today. I personally reviewed the HPI, PMH, FH, SH, ROS and medications/allergies. I have spoken to KENROY Stanley regarding the history and have personally determined the assessment and plan of care, and documented this myself. I was present and participated in all key portions of the encounter and all procedures noted above. I have made changes in the body of the note where appropriate.\par \par Attesting Faculty: See Attending Signature Below

## 2022-10-18 ENCOUNTER — NON-APPOINTMENT (OUTPATIENT)
Age: 52
End: 2022-10-18

## 2022-10-19 ENCOUNTER — APPOINTMENT (OUTPATIENT)
Dept: NEUROSURGERY | Facility: CLINIC | Age: 52
End: 2022-10-19

## 2022-10-19 ENCOUNTER — APPOINTMENT (OUTPATIENT)
Dept: OTOLARYNGOLOGY | Facility: CLINIC | Age: 52
End: 2022-10-19

## 2022-10-19 VITALS
TEMPERATURE: 97.8 F | HEART RATE: 69 BPM | HEIGHT: 62 IN | BODY MASS INDEX: 28.89 KG/M2 | DIASTOLIC BLOOD PRESSURE: 78 MMHG | WEIGHT: 157 LBS | SYSTOLIC BLOOD PRESSURE: 124 MMHG

## 2022-10-19 DIAGNOSIS — Z98.890 OTHER SPECIFIED POSTPROCEDURAL STATES: ICD-10-CM

## 2022-10-19 DIAGNOSIS — J34.89 OTHER SPECIFIED DISORDERS OF NOSE AND NASAL SINUSES: ICD-10-CM

## 2022-10-19 PROCEDURE — 99024 POSTOP FOLLOW-UP VISIT: CPT

## 2022-10-19 PROCEDURE — 31231 NASAL ENDOSCOPY DX: CPT | Mod: 58

## 2022-10-19 NOTE — REASON FOR VISIT
[FreeTextEntry1] : 51F, no major PMH, PSH of nasal surgery/rhinoplasty 15 years ago. 5 years ago developed copious watery drainage from nose, a/w positional headache, found to have nasal CSF leak w/ large encephalocele, s/p endoscopic endonasal repair of CSF leak and removal of encephalocele with abdominal fat graft and lumbar drain on 9/22/22. Patient developed mild LLE weakness after surgery, likely from lumbosacral nerve root irritation from lumbar drain. Since improved with PTOT\par \par Today, reports that 5 days ago (Saturday), patient developed moderate clear rhinorhea, since resolved over the next 1-2 days. Denies meningismus, photo/phono-phobia, HA (beyond usual very mild/occ HA resovled with tylenol), N/V, or other concerning neurologic findings. \par \par Neuro Exam: \par No leak with challenge. No neck stiffness with passive or active movement\par L IP 4+/5, KE 4+/5, otherwise neurologically intact\par incisions c/d/i\par \par

## 2022-10-19 NOTE — END OF VISIT
[FreeTextEntry3] : I personally saw and examined RILEY INNOCENT in detail.  I spoke to SHAWANDA Douglass regarding the assessment and plan of care. I performed the procedures and relevant physical exam.  I have reviewed the above assessment and plan of care and I agree.  I have made changes to the body of the note wherever necessary and appropriate.\par

## 2022-10-19 NOTE — ASSESSMENT
[FreeTextEntry1] : 51F s/p endoscopic endonasal repair of CSF leak and removal of encephalocele with abdominal fat graft and lumbar drain on 9/22/22. Here with transient c/o clear rhinorhea w/o other concerning signs or symptoms\par \par - f/u with Dr. Woodward\par - continue to monitor nasal drainage -- will collect and call office immediately if develops any further draining or other neurologic symptoms\par \par Patient seen and examined with attending, Dr. Fallon\par Spent a total of 60 minutes with patient

## 2022-10-19 NOTE — REASON FOR VISIT
[Subsequent Evaluation] : a subsequent evaluation for [FreeTextEntry2] : S/P CSF leak repair with Dr. Jimenez & Dr. Woodward 9/22/22

## 2022-10-19 NOTE — ASSESSMENT
[FreeTextEntry1] : Ms. LOPEZ is a 51 year female S/P CSF leak repair with Dr. Jimenez 9/22/22 now with nasal discharge since Saturday as well as R ear pressure.  On exam she has negative CSF leak challenge, scope shows generalized edema, large R sided crusting and PND in the NP. No evidence of active leak appreciated. R ear exam shows possible TM effusion\par \par - cup with instructions given to collect nasal fluid if she is able and will test for beta 2 transferrin\par - Use Sinus wash BID\par - F/U next week with Dr. Woodward \par - will hold off on flonase as she is fairly fresh postop.  \par \par \par

## 2022-10-19 NOTE — HISTORY OF PRESENT ILLNESS
[de-identified] : S/P CSF leak repair with Dr. Jimenez 9/22/22 - Path, neural tissue, chronic inflammation and nasal polypoid tissue. \par Doing well, no clear nasal d/cm, mild congestion.  Sense of smell and taste are good.  using nasal saline but not sinus wash.   [FreeTextEntry1] : pt c/o headache since yesterday as well as small amount of clear nasal discharge, she is doing twice daily sinus rinses and she had an episode on Saturday night she woke from sleep choking and regurgitated food/mucous \par denies fevers or epistaxis

## 2022-10-19 NOTE — PROCEDURE
[FreeTextEntry6] : reason for exam: anterior rhinoscopy insufficient for symptom evaluation \par \par Fiberoptic nasal endoscopy was performed.  R/b/a of procedure was explained to the patient and they agreed to proceed with procedure.  Nasal cavities were treated with afrin and lidocaine prior to nasal endoscopy to make the patient more comfortable.  Significant factors noted: \par large crusting R side frontal recess, generalized edema, no active CSF leaks observed, PND in the NP  Otherwise normal mucosa, normal b/l inferior, middle and superior turbinates, inferior, middle and superior meati and sphenoethmoidal recess.  No nasal polyps.  Septum deviated left \par \par \par \par scope #: 27\par \par

## 2022-10-19 NOTE — PHYSICAL EXAM
[Normal] : external ears are normal bilaterally [Nasal Endoscopy Performed] : nasal endoscopy was performed, see procedure section for findings [de-identified] : R TM effusion [] : septum deviated to the left [de-identified] : negative csf challenge

## 2022-10-27 ENCOUNTER — APPOINTMENT (OUTPATIENT)
Dept: OTOLARYNGOLOGY | Facility: CLINIC | Age: 52
End: 2022-10-27
Payer: COMMERCIAL

## 2022-10-27 VITALS
HEIGHT: 62 IN | WEIGHT: 160 LBS | BODY MASS INDEX: 29.44 KG/M2 | DIASTOLIC BLOOD PRESSURE: 83 MMHG | OXYGEN SATURATION: 100 % | SYSTOLIC BLOOD PRESSURE: 115 MMHG | HEART RATE: 59 BPM

## 2022-10-27 DIAGNOSIS — H93.11 TINNITUS, RIGHT EAR: ICD-10-CM

## 2022-10-27 DIAGNOSIS — J34.89 OTHER SPECIFIED DISORDERS OF NOSE AND NASAL SINUSES: ICD-10-CM

## 2022-10-27 DIAGNOSIS — G96.01 CRANIAL CEREBROSPINAL FLUID LEAK, SPONTANEOUS: ICD-10-CM

## 2022-10-27 DIAGNOSIS — J31.0 CHRONIC RHINITIS: ICD-10-CM

## 2022-10-27 PROCEDURE — 31237 NSL/SINS NDSC SURG BX POLYPC: CPT | Mod: 50,58

## 2022-10-27 PROCEDURE — 30560 LYSIS INTRANASAL SYNECHIA: CPT | Mod: 50,RT,78

## 2022-10-27 PROCEDURE — 99024 POSTOP FOLLOW-UP VISIT: CPT

## 2022-10-27 RX ORDER — TRAMADOL HYDROCHLORIDE 50 MG/1
50 TABLET, COATED ORAL
Qty: 30 | Refills: 0 | Status: ACTIVE | COMMUNITY
Start: 2022-09-27

## 2022-10-27 RX ORDER — LEVETIRACETAM 500 MG/1
500 TABLET, FILM COATED ORAL
Qty: 6 | Refills: 0 | Status: ACTIVE | COMMUNITY
Start: 2022-09-27

## 2022-10-27 NOTE — END OF VISIT
[FreeTextEntry3] : I personally saw and examined Ms. RILEY LOPEZ in detail this visit today. I personally reviewed the HPI, PMH, FH, SH, ROS and medications/allergies. I have spoken to KENROY Stanley regarding the history and have personally determined the assessment and plan of care, and documented this myself. I was present and participated in all key portions of the encounter and all procedures noted above. I have made changes in the body of the note where appropriate.\par \par Attesting Faculty: See Attending Signature Below

## 2022-10-27 NOTE — HISTORY OF PRESENT ILLNESS
[de-identified] : S/p CSF leak repair with Dr. Gayle 9/22/22\par Seen by Dr. Colon on 10/19/22 and c/o clear fluid d/c from the right side.  she was given a cup to collect the d/c, however, she not it has not been enough to collect.  Last d/c was this morning.   Occasional Headache.\par Also gets mild tinnitus in right ear. \par Doing Sinus wash, has been holding off on Flonase.

## 2022-10-27 NOTE — PROCEDURE
[FreeTextEntry6] : Flexible scope #205, And rigid scope 112 was used. Right nasal passage with inferior turb to septum adhesion that was lysed with iris scissors after topicalizing with afrin/lidocaine. silver nitrate used for hemostasis and surgicel packed between the septum and turb to prevent readhesion. Nasal passage patent with clear middle meatus.  Crusting at frontal sinus opening suctions. Moist crusting but no evidence of active CSF leak even with valsalva. Ethmoids clear and well healed. Max antrostomy well healed and patent. No mucopurulence or polyps appreciated. Nasopharynx clear.\par \par

## 2022-10-27 NOTE — ASSESSMENT
[FreeTextEntry1] : S/p CSF leak repair with Dr. Galye 9/22/22.  Now noting some clear d/c from Right nare. No evidence of CSF leak on scope:\par - adhesion lysed between inf turb and septum\par - hold off irrigations until tomorrow\par - if any recurrent clear drainage to call office for sooner eval\par - f/u 2 weeks to recheck\par \par Right Tinnitus/popping:\par - likely related to ETD\par - ear exam normal today\par - will allow sinus to finish healing and reassess \par

## 2022-10-31 ENCOUNTER — APPOINTMENT (OUTPATIENT)
Dept: OTOLARYNGOLOGY | Facility: CLINIC | Age: 52
End: 2022-10-31

## 2022-11-17 ENCOUNTER — APPOINTMENT (OUTPATIENT)
Dept: OTOLARYNGOLOGY | Facility: CLINIC | Age: 52
End: 2022-11-17

## 2022-11-28 RX ORDER — ALPRAZOLAM 0.25 MG/1
0.25 TABLET ORAL
Qty: 2 | Refills: 0 | Status: ACTIVE | COMMUNITY
Start: 2022-11-28 | End: 1900-01-01

## 2022-12-06 ENCOUNTER — OUTPATIENT (OUTPATIENT)
Dept: OUTPATIENT SERVICES | Facility: HOSPITAL | Age: 52
LOS: 1 days | End: 2022-12-06
Payer: COMMERCIAL

## 2022-12-06 ENCOUNTER — APPOINTMENT (OUTPATIENT)
Dept: MRI IMAGING | Facility: CLINIC | Age: 52
End: 2022-12-06

## 2022-12-06 DIAGNOSIS — Z98.890 OTHER SPECIFIED POSTPROCEDURAL STATES: Chronic | ICD-10-CM

## 2022-12-06 DIAGNOSIS — Z98.84 BARIATRIC SURGERY STATUS: Chronic | ICD-10-CM

## 2022-12-06 DIAGNOSIS — G96.01 CRANIAL CEREBROSPINAL FLUID LEAK, SPONTANEOUS: ICD-10-CM

## 2022-12-06 DIAGNOSIS — Z00.8 ENCOUNTER FOR OTHER GENERAL EXAMINATION: ICD-10-CM

## 2022-12-06 DIAGNOSIS — Z98.51 TUBAL LIGATION STATUS: Chronic | ICD-10-CM

## 2022-12-06 PROCEDURE — 70543 MRI ORBT/FAC/NCK W/O &W/DYE: CPT | Mod: 26

## 2022-12-06 PROCEDURE — 70553 MRI BRAIN STEM W/O & W/DYE: CPT | Mod: 26

## 2022-12-06 PROCEDURE — A9585: CPT

## 2022-12-06 PROCEDURE — 70543 MRI ORBT/FAC/NCK W/O &W/DYE: CPT

## 2022-12-06 PROCEDURE — 70553 MRI BRAIN STEM W/O & W/DYE: CPT

## 2022-12-07 ENCOUNTER — APPOINTMENT (OUTPATIENT)
Dept: NEUROSURGERY | Facility: CLINIC | Age: 52
End: 2022-12-07

## 2022-12-07 PROCEDURE — 99024 POSTOP FOLLOW-UP VISIT: CPT

## 2022-12-07 NOTE — HISTORY OF PRESENT ILLNESS
[FreeTextEntry1] : 51F, no major PMH, PSH of nasal surgery/rhinoplasty 15 years ago. 5 years ago developed copious watery drainage from nose, a/w positional headache, found to have nasal CSF leak w/ large encephalocele, s/p endoscopic endonasal repair of CSF leak and removal of encephalocele with abdominal fat graft and lumbar drain on 9/22/22. Patient developed mild LLE weakness after surgery, likely from lumbosacral nerve root irritation from lumbar drain. Since improved with PTOT. On last visit 10/19/22 reported intermittent clear rhinorrhea, since slowed down considerably to 1x every 2 weeks.\par \par Walking has improved, as have all other concering symptoms.\par \par Neuro Exam: \par No leak with challenge. No neck stiffness with passive or active movement\par otherwise neurologically intact\par incisions c/d/i

## 2022-12-07 NOTE — ASSESSMENT
[FreeTextEntry1] : 51F s/p endoscopic endonasal repair of CSF leak and removal of encephalocele with abdominal fat graft and lumbar drain on 9/22/22. Here with transient c/o clear rhinorhea w/o other concerning signs or symptoms. Collected a sample of fluid and brought with him.\par \par - f/u with Dr. Woodward\par - continue to monitor nasal drainage - send sample today for [id number]\par - call office in 3 weeks\par \par Patient seen and examined with attending, Dr. Fallon\par Spent a total of 60 minutes with patient. \par

## 2022-12-08 LAB — BETA-2 TRANSFERRIN: NEGATIVE

## 2023-02-28 NOTE — PROGRESS NOTE ADULT - PROBLEM SELECTOR PLAN 1
- Monitor for CSF leak.   - TSRP Precautions (no incentive spirometry, no straws, no BIPAP)  - Humidified O2 prn.   - Pain meds prn.   - ENT will follow.   - Call with questions.  - F/u outpatient with Dr. Woodward in 2 weeks. No

## (undated) DEVICE — PREP CHLORAPREP HI-LITE ORANGE 26ML

## (undated) DEVICE — DRAPE SURGICAL #1010

## (undated) DEVICE — STORZ DURA MICRO KNIFE INSERT POINTED

## (undated) DEVICE — SUT CHROMIC 3-0 30" V-20

## (undated) DEVICE — PREP BETADINE SPONGE STICKS

## (undated) DEVICE — FOLEY TRAY 16FR LF URINE METER SURESTEP

## (undated) DEVICE — SYR LUER LOK 10CC

## (undated) DEVICE — ELCTR BOVIE TIP BLADE INSULATED 2.75" EDGE

## (undated) DEVICE — DRSG MEROCEL STANDARD W STRING 8CM

## (undated) DEVICE — DRSG OPSITE 13.75 X 4"

## (undated) DEVICE — DRSG CURITY GAUZE SPONGE 4 X 4" 12-PLY

## (undated) DEVICE — CLEANING SHEATH ENDO-SCRUB FOR STORZ 7210FA ARTHROSCOPE 4MM 45 DEGREE

## (undated) DEVICE — MEDICATION LABELS W MARKER

## (undated) DEVICE — BLADE MEDTRONIC ENT TRICUT NON-ROTATABLE STRAIGHT 4MM X 13CM

## (undated) DEVICE — WARMING BLANKET LOWER ADULT

## (undated) DEVICE — ELCTR BOVIE PENCIL SMOKE EVACUATION

## (undated) DEVICE — VENODYNE/SCD SLEEVE CALF MEDIUM

## (undated) DEVICE — GLV 8.5 PROTEXIS (WHITE)

## (undated) DEVICE — DRSG MEROCEL STANDARD NO STRING 8CM X 2CM

## (undated) DEVICE — SPHERE MARKER (5 SPHERES)

## (undated) DEVICE — DRSG TAPE HYPAFIX 4"

## (undated) DEVICE — DRAPE 1/2 SHEET 40X57"

## (undated) DEVICE — SNAP ON SPHERZ 5 PACK

## (undated) DEVICE — GOWN TRIMAX LG

## (undated) DEVICE — Device

## (undated) DEVICE — DRAPE CAMERA VIDEO 7"X96"

## (undated) DEVICE — STAPLER SKIN VISI-STAT 35 WIDE

## (undated) DEVICE — BUR MEDTRONIC ENT TRANSNASAL SKULL BASE DIAMOND ROUND 15 DEGREE 4.5MM X 13CM

## (undated) DEVICE — STORZ DURA MICRO KNIFE INSERT SICKLE-SHAPED

## (undated) DEVICE — GLV 8 PROTEXIS (WHITE)

## (undated) DEVICE — SOL IRR POUR NS 0.9% 500ML

## (undated) DEVICE — LUMBAR PUNCTURE KIT 20G X 3.5"

## (undated) DEVICE — ELCTR BIPOLAR PROBE

## (undated) DEVICE — CLEANING SHEATH ENDO-SCRUB FOR STORZ 7210AA TELESCOPE 4MM 0 DEGREE

## (undated) DEVICE — WARMING BLANKET UPPER ADULT

## (undated) DEVICE — DRAPE SPLIT SHEET 77" X 108"

## (undated) DEVICE — DRAPE MINOR PROCEDURE

## (undated) DEVICE — APPLICATOR MICROMYST

## (undated) DEVICE — SUT MONOCRYL 4-0 18" PS-2

## (undated) DEVICE — ELCTR 4-DISC 20MM 49" (RED, BLUE, GREEN, BLACK)

## (undated) DEVICE — ELCTR PEDICLE SCREW PROBE 3MM BALL 1.8MM X 100MM

## (undated) DEVICE — POSITIONER FOAM HEADREST (PINK)

## (undated) DEVICE — CLEANING SHEATH ENDO-SCRUB FOR STORZ 7230CA ARTHROSCOPE 4MM 70 DEGREE

## (undated) DEVICE — VISITEC 4X4

## (undated) DEVICE — APPLICATOR EXTENDED TIP 8CM

## (undated) DEVICE — BUR MEDTRONIC ENT TRANSNASAL SKULL BASE FLUTED 15 DEGREE 3.0MM X 13CM

## (undated) DEVICE — GLV 7.5 PROTEXIS (WHITE)

## (undated) DEVICE — SOL IRR POUR H2O 250ML

## (undated) DEVICE — DRSG NASOPORE 8CM STANDARD

## (undated) DEVICE — DRSG NASOPORE 4CM STANDARD

## (undated) DEVICE — ELCTR MONOPOLAR STIMULATOR PROBE FLUSH-TIP

## (undated) DEVICE — NDL SPINAL 18G X 3.5" (PINK)

## (undated) DEVICE — FOLEY TRAY 16FR 5CC LTX UMETER CLOSED

## (undated) DEVICE — ELCTR SUBDERMAL CORKSCREW NDL 1.2MM

## (undated) DEVICE — PREP BETADINE KIT

## (undated) DEVICE — ELCTR BOVIE TIP NEEDLE INSULATED 2.8" EDGE

## (undated) DEVICE — VENODYNE/SCD SLEEVE CALF LARGE

## (undated) DEVICE — TUBING BIPOLAR IRRIGATOR AND CORD SET

## (undated) DEVICE — DRAPE MAYO STAND 30"

## (undated) DEVICE — DRAPE LIGHT HANDLE COVER (GREEN)

## (undated) DEVICE — SPECIMEN CONTAINER 100ML

## (undated) DEVICE — DRSG TELFA 3 X 8

## (undated) DEVICE — NDL HYPO REGULAR BEVEL 25G X 1.5" (BLUE)

## (undated) DEVICE — GLV 6.5 PROTEXIS (WHITE)

## (undated) DEVICE — ELCTR SUBDERMAL NDL 27G X 1/2" WITH TWISTED PAIR

## (undated) DEVICE — GLV 7 PROTEXIS (WHITE)

## (undated) DEVICE — SUCTION COAGULATOR HAND CONTROL 10FR X 6"

## (undated) DEVICE — DRAPE TOWEL BLUE 17" X 24"

## (undated) DEVICE — ELCTR SUBDERMAL NDL CLASSIC 1.5M X 59" (6 COLOR)

## (undated) DEVICE — PACK LUMBAR LAMI

## (undated) DEVICE — DRSG XEROFORM 1 X 8"

## (undated) DEVICE — DRSG SPLINT INTRA NASAL .25MM STANDARD THIN

## (undated) DEVICE — SUT CHROMIC GUT 4-0 18" P-13

## (undated) DEVICE — SUT VICRYL 3-0 18" X-1 (POP-OFF)

## (undated) DEVICE — TUBING SUCTION 20FT

## (undated) DEVICE — DRSG STERISTRIPS 0.5 X 4"

## (undated) DEVICE — VAGINAL PACKING 2 X 6"

## (undated) DEVICE — ENDO SCRUB

## (undated) DEVICE — DRAPE 3/4 SHEET W REINFORCEMENT 56X77"

## (undated) DEVICE — TUBING IRRIGATION NASAL BURR

## (undated) DEVICE — MARKING PEN W RULER

## (undated) DEVICE — DRAPE C ARM UNIVERSAL

## (undated) DEVICE — POSITIONER FOAM EGG CRATE ULNAR 2PCS (PINK)